# Patient Record
Sex: MALE | Race: WHITE | NOT HISPANIC OR LATINO | Employment: UNEMPLOYED | URBAN - METROPOLITAN AREA
[De-identification: names, ages, dates, MRNs, and addresses within clinical notes are randomized per-mention and may not be internally consistent; named-entity substitution may affect disease eponyms.]

---

## 2020-04-21 ENCOUNTER — APPOINTMENT (OUTPATIENT)
Dept: GENERAL RADIOLOGY | Facility: HOSPITAL | Age: 60
End: 2020-04-21

## 2020-04-21 ENCOUNTER — HOSPITAL ENCOUNTER (INPATIENT)
Facility: HOSPITAL | Age: 60
LOS: 5 days | Discharge: HOME | End: 2020-04-27
Attending: EMERGENCY MEDICINE | Admitting: INTERNAL MEDICINE

## 2020-04-21 DIAGNOSIS — R07.9 CHEST PAIN, UNSPECIFIED TYPE: Primary | ICD-10-CM

## 2020-04-21 DIAGNOSIS — E23.0 HYPOPITUITARISM (HCC): ICD-10-CM

## 2020-04-21 DIAGNOSIS — I10 UNCONTROLLED HYPERTENSION: ICD-10-CM

## 2020-04-21 DIAGNOSIS — E23.6 EMPTY SELLA SYNDROME (HCC): ICD-10-CM

## 2020-04-21 LAB
ALBUMIN SERPL-MCNC: 4 G/DL (ref 3.5–5.2)
ALBUMIN/GLOB SERPL: 1.4 G/DL
ALP SERPL-CCNC: 127 U/L (ref 39–117)
ALT SERPL W P-5'-P-CCNC: 53 U/L (ref 1–41)
ANION GAP SERPL CALCULATED.3IONS-SCNC: 15 MMOL/L (ref 5–15)
AST SERPL-CCNC: 52 U/L (ref 1–40)
BASOPHILS # BLD MANUAL: 0.11 10*3/MM3 (ref 0–0.2)
BASOPHILS NFR BLD AUTO: 1 % (ref 0–1.5)
BILIRUB SERPL-MCNC: 0.3 MG/DL (ref 0.2–1.2)
BUN BLD-MCNC: 15 MG/DL (ref 6–20)
BUN/CREAT SERPL: 11.9 (ref 7–25)
CALCIUM SPEC-SCNC: 8.9 MG/DL (ref 8.6–10.5)
CHLORIDE SERPL-SCNC: 101 MMOL/L (ref 98–107)
CO2 SERPL-SCNC: 22 MMOL/L (ref 22–29)
CREAT BLD-MCNC: 1.26 MG/DL (ref 0.76–1.27)
D DIMER PPP FEU-MCNC: 0.35 MCGFEU/ML (ref 0.17–0.59)
DEPRECATED RDW RBC AUTO: 42.4 FL (ref 37–54)
EOSINOPHIL # BLD MANUAL: 0.56 10*3/MM3 (ref 0–0.4)
EOSINOPHIL NFR BLD MANUAL: 5 % (ref 0.3–6.2)
ERYTHROCYTE [DISTWIDTH] IN BLOOD BY AUTOMATED COUNT: 13.9 % (ref 12.3–15.4)
GFR SERPL CREATININE-BSD FRML MDRD: 59 ML/MIN/1.73
GFR SERPL CREATININE-BSD FRML MDRD: 71 ML/MIN/1.73
GLOBULIN UR ELPH-MCNC: 2.9 GM/DL
GLUCOSE BLD-MCNC: 120 MG/DL (ref 65–99)
HCT VFR BLD AUTO: 41.2 % (ref 37.5–51)
HGB BLD-MCNC: 15 G/DL (ref 13–17.7)
HOLD SPECIMEN: NORMAL
LYMPHOCYTES # BLD MANUAL: 3.77 10*3/MM3 (ref 0.7–3.1)
LYMPHOCYTES NFR BLD MANUAL: 34 % (ref 19.6–45.3)
LYMPHOCYTES NFR BLD MANUAL: 5 % (ref 5–12)
MCH RBC QN AUTO: 31.7 PG (ref 26.6–33)
MCHC RBC AUTO-ENTMCNC: 36.3 G/DL (ref 31.5–35.7)
MCV RBC AUTO: 87.2 FL (ref 79–97)
MONOCYTES # BLD AUTO: 0.56 10*3/MM3 (ref 0.1–0.9)
NEUTROPHILS # BLD AUTO: 6.11 10*3/MM3 (ref 1.7–7)
NEUTROPHILS NFR BLD MANUAL: 53 % (ref 42.7–76)
NEUTS BAND NFR BLD MANUAL: 2 % (ref 0–5)
PLAT MORPH BLD: NORMAL
PLATELET # BLD AUTO: 244 10*3/MM3 (ref 140–450)
PMV BLD AUTO: 7.9 FL (ref 6–12)
POTASSIUM BLD-SCNC: 4 MMOL/L (ref 3.5–5.2)
PROT SERPL-MCNC: 6.9 G/DL (ref 6–8.5)
RBC # BLD AUTO: 4.73 10*6/MM3 (ref 4.14–5.8)
RBC MORPH BLD: NORMAL
SCAN SLIDE: NORMAL
SODIUM BLD-SCNC: 138 MMOL/L (ref 136–145)
TROPONIN T SERPL-MCNC: <0.01 NG/ML (ref 0–0.03)
WBC MORPH BLD: NORMAL
WBC NRBC COR # BLD: 11.1 10*3/MM3 (ref 3.4–10.8)
WHOLE BLOOD HOLD SPECIMEN: NORMAL

## 2020-04-21 PROCEDURE — 80061 LIPID PANEL: CPT | Performed by: NURSE PRACTITIONER

## 2020-04-21 PROCEDURE — 71045 X-RAY EXAM CHEST 1 VIEW: CPT

## 2020-04-21 PROCEDURE — 84443 ASSAY THYROID STIM HORMONE: CPT | Performed by: NURSE PRACTITIONER

## 2020-04-21 PROCEDURE — 99285 EMERGENCY DEPT VISIT HI MDM: CPT

## 2020-04-21 PROCEDURE — 83721 ASSAY OF BLOOD LIPOPROTEIN: CPT | Performed by: NURSE PRACTITIONER

## 2020-04-21 PROCEDURE — 80053 COMPREHEN METABOLIC PANEL: CPT | Performed by: EMERGENCY MEDICINE

## 2020-04-21 PROCEDURE — 83880 ASSAY OF NATRIURETIC PEPTIDE: CPT | Performed by: NURSE PRACTITIONER

## 2020-04-21 PROCEDURE — 85025 COMPLETE CBC W/AUTO DIFF WBC: CPT | Performed by: EMERGENCY MEDICINE

## 2020-04-21 PROCEDURE — 84484 ASSAY OF TROPONIN QUANT: CPT | Performed by: EMERGENCY MEDICINE

## 2020-04-21 PROCEDURE — 85007 BL SMEAR W/DIFF WBC COUNT: CPT | Performed by: EMERGENCY MEDICINE

## 2020-04-21 PROCEDURE — 93005 ELECTROCARDIOGRAM TRACING: CPT | Performed by: EMERGENCY MEDICINE

## 2020-04-21 PROCEDURE — 85379 FIBRIN DEGRADATION QUANT: CPT | Performed by: EMERGENCY MEDICINE

## 2020-04-21 RX ORDER — NITROGLYCERIN 0.4 MG/1
0.4 TABLET SUBLINGUAL
Status: COMPLETED | OUTPATIENT
Start: 2020-04-21 | End: 2020-04-22

## 2020-04-21 RX ORDER — ASPIRIN 81 MG/1
324 TABLET, CHEWABLE ORAL ONCE
Status: COMPLETED | OUTPATIENT
Start: 2020-04-21 | End: 2020-04-21

## 2020-04-21 RX ORDER — SODIUM CHLORIDE 0.9 % (FLUSH) 0.9 %
10 SYRINGE (ML) INJECTION AS NEEDED
Status: DISCONTINUED | OUTPATIENT
Start: 2020-04-21 | End: 2020-04-27 | Stop reason: HOSPADM

## 2020-04-21 RX ADMIN — NITROGLYCERIN 0.4 MG: 0.4 TABLET SUBLINGUAL at 23:14

## 2020-04-21 RX ADMIN — ASPIRIN 81 MG 324 MG: 81 TABLET ORAL at 23:12

## 2020-04-21 RX ADMIN — NITROGLYCERIN 0.4 MG: 0.4 TABLET SUBLINGUAL at 23:19

## 2020-04-22 ENCOUNTER — APPOINTMENT (OUTPATIENT)
Dept: NUCLEAR MEDICINE | Facility: HOSPITAL | Age: 60
End: 2020-04-22

## 2020-04-22 ENCOUNTER — APPOINTMENT (OUTPATIENT)
Dept: CARDIOLOGY | Facility: HOSPITAL | Age: 60
End: 2020-04-22

## 2020-04-22 PROBLEM — E78.2 MIXED HYPERLIPIDEMIA: Chronic | Status: ACTIVE | Noted: 2020-04-22

## 2020-04-22 PROBLEM — E78.1 HYPERTRIGLYCERIDEMIA: Status: ACTIVE | Noted: 2020-04-22

## 2020-04-22 PROBLEM — R74.8 ELEVATED LIVER ENZYMES: Status: ACTIVE | Noted: 2020-04-22

## 2020-04-22 PROBLEM — R07.9 CHEST PAIN: Status: ACTIVE | Noted: 2020-04-22

## 2020-04-22 PROBLEM — F32.A DEPRESSION: Status: ACTIVE | Noted: 2020-04-22

## 2020-04-22 PROBLEM — E23.6 EMPTY SELLA SYNDROME (HCC): Status: ACTIVE | Noted: 2020-04-22

## 2020-04-22 PROBLEM — E78.1 HYPERTRIGLYCERIDEMIA: Chronic | Status: ACTIVE | Noted: 2020-04-22

## 2020-04-22 PROBLEM — I10 ESSENTIAL HYPERTENSION: Chronic | Status: ACTIVE | Noted: 2020-04-22

## 2020-04-22 PROBLEM — F10.10 ALCOHOL ABUSE: Chronic | Status: ACTIVE | Noted: 2020-04-22

## 2020-04-22 PROBLEM — E78.2 MIXED HYPERLIPIDEMIA: Status: ACTIVE | Noted: 2020-04-22

## 2020-04-22 PROBLEM — F32.A DEPRESSION: Chronic | Status: ACTIVE | Noted: 2020-04-22

## 2020-04-22 PROBLEM — E66.01 OBESITY, CLASS III, BMI 40-49.9 (MORBID OBESITY) (HCC): Chronic | Status: ACTIVE | Noted: 2020-04-22

## 2020-04-22 PROBLEM — I42.9 CARDIOMYOPATHY (HCC): Status: ACTIVE | Noted: 2020-04-22

## 2020-04-22 LAB
ANION GAP SERPL CALCULATED.3IONS-SCNC: 12 MMOL/L (ref 5–15)
ARTICHOKE IGE QN: 151 MG/DL (ref 0–100)
BASOPHILS # BLD AUTO: 0.1 10*3/MM3 (ref 0–0.2)
BASOPHILS NFR BLD AUTO: 0.9 % (ref 0–1.5)
BH CV NUCLEAR PRIOR STUDY: 3
BH CV STRESS BP STAGE 1: NORMAL
BH CV STRESS BP STAGE 2: NORMAL
BH CV STRESS BP STAGE 3: NORMAL
BH CV STRESS BP STAGE 4: NORMAL
BH CV STRESS COMMENTS STAGE 1: NORMAL
BH CV STRESS COMMENTS STAGE 2: NORMAL
BH CV STRESS DOSE REGADENOSON STAGE 1: 0.4
BH CV STRESS DURATION MIN STAGE 1: 0
BH CV STRESS DURATION MIN STAGE 2: 4
BH CV STRESS DURATION SEC STAGE 1: 10
BH CV STRESS DURATION SEC STAGE 2: 0
BH CV STRESS HR STAGE 1: 82
BH CV STRESS HR STAGE 2: 84
BH CV STRESS HR STAGE 3: 83
BH CV STRESS HR STAGE 4: 80
BH CV STRESS PROTOCOL 1: NORMAL
BH CV STRESS RECOVERY BP: NORMAL MMHG
BH CV STRESS RECOVERY HR: 79 BPM
BH CV STRESS STAGE 1: 1
BH CV STRESS STAGE 2: 2
BH CV STRESS STAGE 3: 3
BH CV STRESS STAGE 4: 4
BUN BLD-MCNC: 21 MG/DL (ref 6–20)
BUN/CREAT SERPL: 17.5 (ref 7–25)
CALCIUM SPEC-SCNC: 8.8 MG/DL (ref 8.6–10.5)
CHLORIDE SERPL-SCNC: 104 MMOL/L (ref 98–107)
CHOLEST SERPL-MCNC: 259 MG/DL (ref 0–200)
CO2 SERPL-SCNC: 22 MMOL/L (ref 22–29)
CREAT BLD-MCNC: 1.2 MG/DL (ref 0.76–1.27)
DEPRECATED RDW RBC AUTO: 42.4 FL (ref 37–54)
EOSINOPHIL # BLD AUTO: 0.4 10*3/MM3 (ref 0–0.4)
EOSINOPHIL NFR BLD AUTO: 4.8 % (ref 0.3–6.2)
ERYTHROCYTE [DISTWIDTH] IN BLOOD BY AUTOMATED COUNT: 13.9 % (ref 12.3–15.4)
GFR SERPL CREATININE-BSD FRML MDRD: 62 ML/MIN/1.73
GLUCOSE BLD-MCNC: 96 MG/DL (ref 65–99)
HAV IGM SERPL QL IA: NORMAL
HBV CORE IGM SERPL QL IA: NORMAL
HBV SURFACE AG SERPL QL IA: NORMAL
HCT VFR BLD AUTO: 39 % (ref 37.5–51)
HCV AB SER DONR QL: NORMAL
HDLC SERPL-MCNC: 31 MG/DL (ref 40–60)
HGB BLD-MCNC: 13.1 G/DL (ref 13–17.7)
HOLD SPECIMEN: NORMAL
HOLD SPECIMEN: NORMAL
LDLC SERPL CALC-MCNC: ABNORMAL MG/DL
LDLC/HDLC SERPL: ABNORMAL {RATIO}
LV EF NUC BP: 57 %
LYMPHOCYTES # BLD AUTO: 3.2 10*3/MM3 (ref 0.7–3.1)
LYMPHOCYTES NFR BLD AUTO: 40.2 % (ref 19.6–45.3)
MAXIMAL PREDICTED HEART RATE: 162 BPM
MCH RBC QN AUTO: 29.4 PG (ref 26.6–33)
MCHC RBC AUTO-ENTMCNC: 33.6 G/DL (ref 31.5–35.7)
MCV RBC AUTO: 87.5 FL (ref 79–97)
MONOCYTES # BLD AUTO: 0.7 10*3/MM3 (ref 0.1–0.9)
MONOCYTES NFR BLD AUTO: 8.8 % (ref 5–12)
NEUTROPHILS # BLD AUTO: 3.6 10*3/MM3 (ref 1.7–7)
NEUTROPHILS NFR BLD AUTO: 45.3 % (ref 42.7–76)
NRBC BLD AUTO-RTO: 0.1 /100 WBC (ref 0–0.2)
NT-PROBNP SERPL-MCNC: 94.7 PG/ML (ref 5–900)
PERCENT MAX PREDICTED HR: 51.85 %
PLATELET # BLD AUTO: 198 10*3/MM3 (ref 140–450)
PMV BLD AUTO: 7.7 FL (ref 6–12)
POTASSIUM BLD-SCNC: 3.9 MMOL/L (ref 3.5–5.2)
RBC # BLD AUTO: 4.46 10*6/MM3 (ref 4.14–5.8)
SODIUM BLD-SCNC: 138 MMOL/L (ref 136–145)
STRESS BASELINE BP: NORMAL MMHG
STRESS BASELINE HR: 71 BPM
STRESS PERCENT HR: 61 %
STRESS POST PEAK BP: NORMAL MMHG
STRESS POST PEAK HR: 84 BPM
STRESS TARGET HR: 138 BPM
T4 FREE SERPL-MCNC: 0.97 NG/DL (ref 0.93–1.7)
TRIGL SERPL-MCNC: 1063 MG/DL (ref 0–150)
TROPONIN T SERPL-MCNC: <0.01 NG/ML (ref 0–0.03)
TROPONIN T SERPL-MCNC: <0.01 NG/ML (ref 0–0.03)
TSH SERPL DL<=0.05 MIU/L-ACNC: 3.88 UIU/ML (ref 0.27–4.2)
VLDLC SERPL-MCNC: ABNORMAL MG/DL
WBC NRBC COR # BLD: 8 10*3/MM3 (ref 3.4–10.8)
WHOLE BLOOD HOLD SPECIMEN: NORMAL

## 2020-04-22 PROCEDURE — 84439 ASSAY OF FREE THYROXINE: CPT | Performed by: INTERNAL MEDICINE

## 2020-04-22 PROCEDURE — 99223 1ST HOSP IP/OBS HIGH 75: CPT | Performed by: INTERNAL MEDICINE

## 2020-04-22 PROCEDURE — 80048 BASIC METABOLIC PNL TOTAL CA: CPT | Performed by: NURSE PRACTITIONER

## 2020-04-22 PROCEDURE — 0 TECHNETIUM SESTAMIBI: Performed by: INTERNAL MEDICINE

## 2020-04-22 PROCEDURE — 93018 CV STRESS TEST I&R ONLY: CPT | Performed by: INTERNAL MEDICINE

## 2020-04-22 PROCEDURE — 93306 TTE W/DOPPLER COMPLETE: CPT

## 2020-04-22 PROCEDURE — 84484 ASSAY OF TROPONIN QUANT: CPT | Performed by: NURSE PRACTITIONER

## 2020-04-22 PROCEDURE — 85025 COMPLETE CBC W/AUTO DIFF WBC: CPT | Performed by: NURSE PRACTITIONER

## 2020-04-22 PROCEDURE — 25010000002 REGADENOSON 0.4 MG/5ML SOLUTION: Performed by: INTERNAL MEDICINE

## 2020-04-22 PROCEDURE — 80074 ACUTE HEPATITIS PANEL: CPT | Performed by: NURSE PRACTITIONER

## 2020-04-22 PROCEDURE — A9500 TC99M SESTAMIBI: HCPCS | Performed by: INTERNAL MEDICINE

## 2020-04-22 PROCEDURE — 93016 CV STRESS TEST SUPVJ ONLY: CPT | Performed by: INTERNAL MEDICINE

## 2020-04-22 PROCEDURE — 78452 HT MUSCLE IMAGE SPECT MULT: CPT | Performed by: INTERNAL MEDICINE

## 2020-04-22 PROCEDURE — 93017 CV STRESS TEST TRACING ONLY: CPT

## 2020-04-22 PROCEDURE — 78452 HT MUSCLE IMAGE SPECT MULT: CPT

## 2020-04-22 RX ORDER — ACETAMINOPHEN 325 MG/1
650 TABLET ORAL EVERY 4 HOURS PRN
Status: DISCONTINUED | OUTPATIENT
Start: 2020-04-22 | End: 2020-04-23

## 2020-04-22 RX ORDER — BISACODYL 5 MG/1
5 TABLET, DELAYED RELEASE ORAL DAILY PRN
Status: DISCONTINUED | OUTPATIENT
Start: 2020-04-22 | End: 2020-04-27 | Stop reason: HOSPADM

## 2020-04-22 RX ORDER — LORAZEPAM 2 MG/ML
2 INJECTION INTRAMUSCULAR
Status: DISCONTINUED | OUTPATIENT
Start: 2020-04-22 | End: 2020-04-27 | Stop reason: HOSPADM

## 2020-04-22 RX ORDER — CHOLECALCIFEROL (VITAMIN D3) 125 MCG
5 CAPSULE ORAL NIGHTLY PRN
Status: DISCONTINUED | OUTPATIENT
Start: 2020-04-22 | End: 2020-04-27 | Stop reason: HOSPADM

## 2020-04-22 RX ORDER — LORAZEPAM 2 MG/ML
1 INJECTION INTRAMUSCULAR
Status: DISCONTINUED | OUTPATIENT
Start: 2020-04-22 | End: 2020-04-27 | Stop reason: HOSPADM

## 2020-04-22 RX ORDER — ONDANSETRON 4 MG/1
4 TABLET, FILM COATED ORAL EVERY 6 HOURS PRN
Status: DISCONTINUED | OUTPATIENT
Start: 2020-04-22 | End: 2020-04-27 | Stop reason: HOSPADM

## 2020-04-22 RX ORDER — LORAZEPAM 1 MG/1
2 TABLET ORAL
Status: DISCONTINUED | OUTPATIENT
Start: 2020-04-22 | End: 2020-04-27 | Stop reason: HOSPADM

## 2020-04-22 RX ORDER — SODIUM CHLORIDE 9 MG/ML
75 INJECTION, SOLUTION INTRAVENOUS CONTINUOUS
Status: DISPENSED | OUTPATIENT
Start: 2020-04-22 | End: 2020-04-23

## 2020-04-22 RX ORDER — LORAZEPAM 1 MG/1
1 TABLET ORAL
Status: DISCONTINUED | OUTPATIENT
Start: 2020-04-22 | End: 2020-04-27 | Stop reason: HOSPADM

## 2020-04-22 RX ORDER — ALUMINA, MAGNESIA, AND SIMETHICONE 2400; 2400; 240 MG/30ML; MG/30ML; MG/30ML
15 SUSPENSION ORAL EVERY 6 HOURS PRN
Status: DISCONTINUED | OUTPATIENT
Start: 2020-04-22 | End: 2020-04-27 | Stop reason: HOSPADM

## 2020-04-22 RX ORDER — ONDANSETRON 2 MG/ML
4 INJECTION INTRAMUSCULAR; INTRAVENOUS EVERY 6 HOURS PRN
Status: DISCONTINUED | OUTPATIENT
Start: 2020-04-22 | End: 2020-04-27 | Stop reason: HOSPADM

## 2020-04-22 RX ORDER — ACETAMINOPHEN 650 MG/1
650 SUPPOSITORY RECTAL EVERY 4 HOURS PRN
Status: DISCONTINUED | OUTPATIENT
Start: 2020-04-22 | End: 2020-04-27 | Stop reason: HOSPADM

## 2020-04-22 RX ORDER — ACETAMINOPHEN 160 MG/5ML
650 SOLUTION ORAL EVERY 4 HOURS PRN
Status: DISCONTINUED | OUTPATIENT
Start: 2020-04-22 | End: 2020-04-27 | Stop reason: HOSPADM

## 2020-04-22 RX ADMIN — SODIUM CHLORIDE 75 ML/HR: 900 INJECTION, SOLUTION INTRAVENOUS at 23:40

## 2020-04-22 RX ADMIN — NITROGLYCERIN 0.4 MG: 0.4 TABLET SUBLINGUAL at 02:43

## 2020-04-22 RX ADMIN — TECHNETIUM TC 99M SESTAMIBI 1 DOSE: 1 INJECTION INTRAVENOUS at 13:30

## 2020-04-22 RX ADMIN — REGADENOSON 0.4 MG: 0.08 INJECTION, SOLUTION INTRAVENOUS at 13:30

## 2020-04-22 RX ADMIN — MELATONIN TAB 5 MG 5 MG: 5 TAB at 03:07

## 2020-04-22 RX ADMIN — MELATONIN TAB 5 MG 5 MG: 5 TAB at 23:41

## 2020-04-22 RX ADMIN — LORAZEPAM 2 MG: 1 TABLET ORAL at 02:47

## 2020-04-22 RX ADMIN — LORAZEPAM 1 MG: 1 TABLET ORAL at 23:40

## 2020-04-22 RX ADMIN — TECHNETIUM TC 99M SESTAMIBI 1 DOSE: 1 INJECTION INTRAVENOUS at 11:45

## 2020-04-22 RX ADMIN — NITROGLYCERIN 0.5 INCH: 20 OINTMENT TOPICAL at 01:36

## 2020-04-22 NOTE — H&P
[facility] Medicine Services      Patient Name: Gabriel Medrano  : 1960  MRN: [id number]  Primary Care Physician: Christina, No Known  Date of admission: 2020    Patient Care Team:  Provider, No Known as PCP - General          Subjective   History Present Illness     Chief Complaint:   Chief Complaint   Patient presents with   • Chest Pain       Mr. Medrano is a 59 y.o. obese  male with a history of hypertension, medical noncompliance, presented to the [facility] ED on 2020 with a complaint of chest pain, shortness of breath, leg swelling.  Patient states he was mowing his lawn this afternoon and afterwards started to feel more short of breath, became diaphoretic with right-sided chest pain that radiated to his back.  Patient was given nitro upon arrival to the ED which relieved his pain.  Patient states he had had a heart cath a long time ago possibly when he was a resident in [place] and that he had some cardiomyopathy but has been noncompliant with medications and does not have a primary care physician here.  Patient states his chest pain is sharp, 7 out of 10, was constant until relieved by the nitroglycerin sublingual tabs.  Patient now complaining more short of shortness of breath.  Patient denies fever, congestion, malaise, cough, nausea, vomiting, diarrhea.     In the ED, initial troponin negative, glucose 120, potassium 4.0, creatinine 1.26, BUN 15, alkaline phosphatase 127, ALT 53, AST 52, d-dimer 0.35, WBC 11.10, Hgb 15.0, HCT 41.2, platelets 244.  EKG: Normal sinus rhythm with a right bundle branch block.  Chest x-ray: No active disease process.  Patient received 324 mg aspirin, nitro sublingual x2.  Patient will be admitted for further evaluation and management.    Review of Systems   Constitution: Negative.   HENT: Negative.    Eyes: Negative.    Cardiovascular: Positive for chest pain, dyspnea on exertion and leg swelling.   Respiratory:  Positive for shortness of breath.    Endocrine: Negative.    Hematologic/Lymphatic: Negative.    Skin: Negative.    Musculoskeletal: Negative.    Gastrointestinal: Negative.    Genitourinary: Negative.    Neurological: Negative.    Psychiatric/Behavioral: Positive for depression. The patient has insomnia.    Allergic/Immunologic: Negative.    All other systems reviewed and are negative.          Personal History     Past Medical History:   Past Medical History:   Diagnosis Date   • Hypertension        Surgical History:      Past Surgical History:   Procedure Laterality Date   • APPENDECTOMY     • LEFT HEART CATH             Family History: family history includes Heart disease in his father; Hyperlipidemia in his father; Hypertension in his father. Otherwise pertinent FHx was reviewed and unremarkable.     Social History:  reports that he has quit smoking. He has never used smokeless tobacco. He reports that he drinks alcohol. He reports that he has current or past drug history.      Medications:  Prior to Admission medications    Not on File       Allergies:  No Known Allergies    Objective   Objective     Vital Signs  Temp:  [97.5 °F (36.4 °C)] 97.5 °F (36.4 °C)  Heart Rate:  [80-90] 80  Resp:  [10-20] 10  BP: ()/() 139/80  SpO2:  [95 %-99 %] 99 %  on   ;      Body mass index is 42.2 kg/m².    Physical Exam   Constitutional: He is oriented to person, place, and time. He appears well-developed and well-nourished.   Morbidly obese   HENT:   Head: Normocephalic.   Eyes: Pupils are equal, round, and reactive to light. Conjunctivae are normal.   Neck: Normal range of motion.   Cardiovascular: Normal rate, regular rhythm, normal heart sounds and intact distal pulses.   Pulmonary/Chest: Effort normal and breath sounds normal.   Abdominal: Soft. Bowel sounds are normal.   Musculoskeletal: Normal range of motion. He exhibits edema.   Trace to 1+ bilateral lower extremities   Neurological: He is alert and  oriented to person, place, and time.   Skin: Skin is warm and dry. Capillary refill takes 2 to 3 seconds.   Psychiatric: His speech is normal and behavior is normal. Judgment and thought content normal. Cognition and memory are normal. He exhibits a depressed mood.   Tearful   Vitals reviewed.      Results Review:  I have personally reviewed most recent cardiac tracings, lab results and radiology images and interpretations and agree with findings.    Results from last 7 days   Lab Units 04/21/20  2305   WBC 10*3/mm3 11.10*   HEMOGLOBIN g/dL 15.0   HEMATOCRIT % 41.2   PLATELETS 10*3/mm3 244     Results from last 7 days   Lab Units 04/21/20  2305   SODIUM mmol/L 138   POTASSIUM mmol/L 4.0   CHLORIDE mmol/L 101   CO2 mmol/L 22.0   BUN mg/dL 15   CREATININE mg/dL 1.26   GLUCOSE mg/dL 120*   CALCIUM mg/dL 8.9   ALT (SGPT) U/L 53*   AST (SGOT) U/L 52*   TROPONIN T ng/mL <0.010     Estimated Creatinine Clearance: 87.7 mL/min (by C-G formula based on SCr of 1.26 mg/dL).  Brief Urine Lab Results     None          Microbiology Results (last 10 days)     ** No results found for the last 240 hours. **          ECG/EMG Results (most recent)     Procedure Component Value Units Date/Time    ECG 12 Lead [[id number]] Collected:  04/21/20 2258     Updated:  04/21/20 2300    Narrative:       HEART RATE= 89  bpm  RR Interval= 672  ms  ID Interval= 203  ms  P Horizontal Axis= 20  deg  P Front Axis= 31  deg  QRSD Interval= 153  ms  QT Interval= 430  ms  QRS Axis= 42  deg  T Wave Axis= 8  deg  - ABNORMAL ECG -  Sinus rhythm  Borderline prolonged ID interval  Right bundle branch block  Electronically Signed By:   Date and Time of Study: 2020-04-21 22:58:46                    Xr Chest 1 View    Result Date: 4/21/2020  No acute process.  Electronically Signed By-Rajesh Nair On:4/21/2020 11:39 PM This report was finalized on [id number] by  Rajesh Nair, .        Estimated Creatinine Clearance: 87.7 mL/min (by C-G formula based on SCr of  1.26 mg/dL).    Assessment/Plan   Assessment/Plan       Active Hospital Problems    Diagnosis  POA   • **Chest pain [R07.9]  Yes     Priority: High   • Depression [F32.9]  Yes     Priority: Medium   • Elevated liver enzymes [R74.8]  Yes     Priority: Medium   • Alcohol abuse [F10.10]  Yes     Priority: Medium      Resolved Hospital Problems   No resolved problems to display.     Chest pain  -Initial troponin<0.01  -Serial troponin  -Nitropaste to chest wall  -Echo in a.m.  -Check BNP, TSH, lipids  -Cardiac monitor  -EKG for chest pain  -Keep n.p.o.    Elevated liver enzymes  -May be secondary to alcohol use  -Check hepatitis panel  -Monitor    Depression  -Patient tearful, states he has had a rough 6 months.  -Patient denies suicidal or homicidal ideation  -Patient states he cannot sleep, he is tearful, and just feels depressed  -May need outpatient follow-up for depression    Alcohol abuse  -Patient drinks 3-4 beers daily  -We will place on alcohol withdrawal protocol  -Encourage lifestyle modification    Morbid obesity  -Encourage lifestyle modification          VTE Prophylaxis -   Mechanical Order History:      Ordered        04/22/20 0049  Place Sequential Compression Device  Once         04/22/20 0049  Maintain Sequential Compression Device  Continuous                 Pharmalogical Order History:     None          CODE STATUS:    Code Status and Medical Interventions:   Ordered at: 04/22/20 0049     Level Of Support Discussed With:    Patient     Code Status:    CPR     Medical Interventions (Level of Support Prior to Arrest):    Full           I discussed the patient's findings and my recommendations with patient.        Electronically signed by ISMA Jackman, 04/22/20, 1:46 AM.  [facility] Hospitalist Team

## 2020-04-22 NOTE — PROGRESS NOTES
Discharge Planning Assessment   [facility]     Patient Name: Gabriel Medrano  MRN: [id number]  Today's Date: 4/22/2020    Admit Date: 4/21/2020    Discharge Needs Assessment     Row Name 04/22/20 9607       Living Environment    Lives With  alone    Current Living Arrangements  home/apartment/condo    Primary Care Provided by  self    Provides Primary Care For  no one    Able to Return to Prior Arrangements  yes       Resource/Environmental Concerns    Resource/Environmental Concerns  none    Transportation Concerns  car, none       Transition Planning    Patient/Family Anticipates Transition to  home    Patient/Family Anticipated Services at Transition  none    Transportation Anticipated  family or friend will provide       Discharge Needs Assessment    Readmission Within the Last 30 Days  no previous admission in last 30 days    Concerns to be Addressed  denies needs/concerns at this time;discharge planning    Equipment Currently Used at Home  none    Anticipated Changes Related to Illness  none    Provided Post Acute Provider List?  Refused    Current Discharge Risk  lives alone        Discharge Plan     Row Name 04/22/20 8170       Plan    Plan  Anticipate routine home. May require walking oximetry 24-48 hours prior to d/c. SW notified of ETOH abuse. Will need one time hospital PCP f/u appt made prior to d/c.     Patient/Family in Agreement with Plan  yes    Plan Comments  Due to covid 19 pandemic and CM working off site called into patients room to discuss d/c plan. From home alone. I with ADLs. Patient still drives. Does not have a PCP and will need one arranged prior to hospital d/c. Notified Juliane WOODALL of ETOH abuse. Does not wear home O2 and currentl on 3 L NC. DC barriers: pending stress test and ECHO         Demographic Summary     Row Name 04/22/20 1459       General Information    Arrived From  emergency department    Required Notices Provided  Observation Status Notice    Referral Source  admission list     Reason for Consult  discharge planning    Preferred Language  English     Used During This Interaction  no        Functional Status     Row Name 04/22/20 1457       Functional Status    Usual Activity Tolerance  good    Current Activity Tolerance  good       Functional Status, IADL    Medications  independent    Meal Preparation  independent    Housekeeping  independent    Laundry  independent    Shopping  independent       Mental Status    General Appearance WDL  WDL          Patient Forms     Row Name 04/22/20 1501       Patient Forms    Important Message from Medicare (IMM)  -- Martin delivered 4/22            Shanta Yañez RN

## 2020-04-22 NOTE — CONSULTS
CARDIOLOGY CONSULT      Requesting Provider    Carolina Pathak MD      PATIENT IDENTIFICATION    Name: Gabriel Medrano Age: 59 y.o. Sex: male :  1960 MRN: [id number]    REASON FOR CONSULTATION:  Chest pain    CHIEF COMPLAINT:  Chest pain    HISTORY OF PRESENT ILLNESS:   This is a 59-year-old male with no established history of ischemic heart disease who presents to the hospital with complaint of chest pain and shortness of breath.  He reports that he had some lower extremity swelling as well.  He reports he tried mowing his lawn on the day of admission and afterwards he began to feel more short of breath and became diaphoretic.  He had chest discomfort however is located in the right side of his chest with radiation to his back.  He rated his pain as a 7 out of 10 in intensity.  It was sharp in characteristic.  His pain was completely palliated with the administration of nitroglycerin in the emergency department.  Serial cardiac biomarkers were negative for acute injury.  Myocardial perfusion study however was abnormal and suggestive of inferior ischemia.    The patient reports he did have a cardiac catheterization several years ago in [place].  He does not recall having a stent placed.    IMPRESSIONS  Chest pain with shortness of breath and diaphoresis with typical and atypical features  Abnormal EKG with right bundle branch block  Abnormal myocardial perfusion study suggestive of inferior ischemia  Hypertension  Hyperlipidemia with marked hypertriglyceridemia     Triglyceride levels of 1063  Obesity  History of traumatic brain injury in the past    RECOMMENDATIONS:  Can attempt statin therapy while closely monitoring LFTs  Risk benefits and options discussed with patient.  We will proceed with cardiac catheterization.  Will likely need Repatha or Praluent in addition to aggressive diet modification    Medical History    Past Medical History:   Diagnosis Date   • Hypertension         Surgical History   "  Past Surgical History:   Procedure Laterality Date   • APPENDECTOMY     • LEFT HEART CATH          Family History    Family History   Problem Relation Age of Onset   • Heart disease Father    • Hyperlipidemia Father    • Hypertension Father        Social History    Social History     Tobacco Use   • Smoking status: Former Smoker   • Smokeless tobacco: Never Used   Substance Use Topics   • Alcohol use: Yes     Frequency: 4 or more times a week     Drinks per session: 3 or 4     Binge frequency: Daily or almost daily        Allergies    No Known Allergies    REVIEW OF SYSTEMS:  Pertinent items are noted in HPI, all other systems reviewed and negative    OBJECTIVE     VITAL SIGNS:  Visit Vitals  /69   Pulse 70   Temp 97.7 °F (36.5 °C) (Oral)   Resp 17   Ht 177.8 cm (70\")   Wt 133 kg (294 lb)   SpO2 97%   BMI 42.18 kg/m²     Oxygen Therapy  SpO2: 97 %  Pulse Oximetry Type: Intermittent  Device (Oxygen Therapy): room air  Device (Oxygen Therapy): room air  Flow (L/min): 3  Flowsheet Rows      First Filed Value   Admission Height  177.8 cm (70\") Documented at 04/21/2020 2252   Admission Weight  133 kg (294 lb 1.5 oz) Documented at 04/21/2020 2252        Intake & Output (last 3 days)       04/19 0701 - 04/20 0700 04/20 0701 - 04/21 0700 04/21 0701 - 04/22 0700 04/22 0701 - 04/23 0700    P.O.    0    Total Intake(mL/kg)    0 (0)    Net    0                Lines, Drains & Airways    Active LDAs     Name:   Placement date:   Placement time:   Site:   Days:    Peripheral IV 04/21/20 2304 Right Arm   04/21/20 2304    Arm   less than 1              /69   Pulse 70   Temp 97.7 °F (36.5 °C) (Oral)   Resp 17   Ht 177.8 cm (70\")   Wt 133 kg (294 lb)   SpO2 97%   BMI 42.18 kg/m²     INTAKE/OUTPUT:    Intake/Output this shift:  No intake/output data recorded.  Intake/Output last 3 shifts:  No intake/output data recorded.      PHYSICAL EXAM:    General: Alert, cooperative, no distress, appears stated " age  Head:  Normocephalic, atraumatic, mucous membranes moist  Eyes:  Conjunctiva/corneas clear, EOM's intact     Neck:  Supple,  no adenopathy;      Lungs: Clear to auscultation bilaterally, no wheezes rhonchi rales are noted  Chest wall: No tenderness  Heart::  Regular rate and rhythm, S1 and S2 normal, no murmur, rub or gallop  Abdomen: Soft, non-tender, nondistended bowel sounds active.  Obese  Extremities: No cyanosis, clubbing, trace edema  Pulses: 2+ and symmetric all extremities  Skin:  No rashes or lesions  Neuro/psych: A&O x3. CN II through XII are grossly intact with appropriate affect    Scheduled Meds:           Continuous Infusions:         PRN Meds:    •  acetaminophen **OR** acetaminophen **OR** acetaminophen  •  aluminum-magnesium hydroxide-simethicone  •  bisacodyl  •  LORazepam **OR** LORazepam **OR** LORazepam **OR** LORazepam **OR** LORazepam **OR** LORazepam  •  magnesium hydroxide  •  melatonin  •  ondansetron **OR** ondansetron  •  sodium chloride     Data Review:     Xrays, labs reviewed personally by provider.    CBC    Results from last 7 days   Lab Units 04/22/20  0723 04/21/20  2305   WBC 10*3/mm3 8.00 11.10*   HEMOGLOBIN g/dL 13.1 15.0   PLATELETS 10*3/mm3 198 244     Cr Clearance Estimated Creatinine Clearance: 92.1 mL/min (by C-G formula based on SCr of 1.2 mg/dL).  Coag     HbA1C No results found for: HGBA1C  Blood Glucose No results found for: POCGLU  Infection     CMP   Results from last 7 days   Lab Units 04/22/20  0723 04/21/20  2305   SODIUM mmol/L 138 138   POTASSIUM mmol/L 3.9 4.0   CHLORIDE mmol/L 104 101   CO2 mmol/L 22.0 22.0   BUN mg/dL 21* 15   CREATININE mg/dL 1.20 1.26   GLUCOSE mg/dL 96 120*   ALBUMIN g/dL  --  4.00   BILIRUBIN mg/dL  --  0.3   ALK PHOS U/L  --  127*   AST (SGOT) U/L  --  52*   ALT (SGPT) U/L  --  53*     ABG      UA      USHA  No results found for: POCMETH, POCAMPHET, POCBARBITUR, POCBENZO, POCCOCAINE, POCOPIATES, POCOXYCODO, POCPHENCYC, POCPROPOXY,  POCTHC, POCTRICYC  Lysis Labs   Results from last 7 days   Lab Units 04/22/20  0723 04/21/20  2305   HEMOGLOBIN g/dL 13.1 15.0   PLATELETS 10*3/mm3 198 244   CREATININE mg/dL 1.20 1.26     Radiology(recent) Xr Chest 1 View    Result Date: 4/21/2020  No acute process.  Electronically Signed By-Rajesh Nair On:4/21/2020 11:39 PM This report was finalized on [id number] by  Rajesh Nair, .        Results from last 7 days   Lab Units 04/22/20  1410   TROPONIN T ng/mL <0.010       ECG/EMG Results (most recent)     Procedure Component Value Units Date/Time    ECG 12 Lead [[id number]] Collected:  04/21/20 2258     Updated:  04/21/20 2300    Narrative:       HEART RATE= 89  bpm  RR Interval= 672  ms  OR Interval= 203  ms  P Horizontal Axis= 20  deg  P Front Axis= 31  deg  QRSD Interval= 153  ms  QT Interval= 430  ms  QRS Axis= 42  deg  T Wave Axis= 8  deg  - ABNORMAL ECG -  Sinus rhythm  Borderline prolonged OR interval  Right bundle branch block  Electronically Signed By:   Date and Time of Study: 2020-04-21 22:58:46    Adult Transthoracic Echo Complete W/ Cont if Necessary Per Protocol [[id number]] Collected:  04/22/20 0835     Updated:  04/22/20 0917     BSA 2.5 m^2      RVIDd 2.7 cm      IVSd 1.1 cm      LVIDd 5.2 cm      LVIDs 3.8 cm      LVPWd 1.4 cm      IVS/LVPW 0.82     FS 26.3 %      EDV(Teich) 128.8 ml      ESV(Teich) 62.9 ml      EF(Teich) 51.2 %      EDV(cubed) 139.6 ml      ESV(cubed) 55.9 ml      EF(cubed) 60.0 %      LV mass(C)d 256.6 grams      LV mass(C)dI 104.4 grams/m^2      SV(Teich) 65.9 ml      SI(Teich) 26.8 ml/m^2      SV(cubed) 83.7 ml      SI(cubed) 34.0 ml/m^2      Ao root diam 3.6 cm      Ao root area 10.1 cm^2      ACS 2.3 cm      LVOT diam 2.3 cm      LVOT area 4.3 cm^2      RVOT diam 2.0 cm      RVOT area 3.2 cm^2      EDV(MOD-sp4) 125.7 ml      ESV(MOD-sp4) 40.5 ml      EF(MOD-sp4) 67.8 %      SV(MOD-sp4) 85.2 ml      SI(MOD-sp4) 34.7 ml/m^2      Ao root area (BSA corrected) 1.5     LV Selby  Vol (BSA corrected) 51.1 ml/m^2      LV Sys Vol (BSA corrected) 16.5 ml/m^2      Aortic R-R 0.99 sec      Aortic HR 60.6 BPM      MV E max alex 91.8 cm/sec      MV A max alex 78.3 cm/sec      MV E/A 1.2     MV V2 max 80.0 cm/sec      MV max PG 2.6 mmHg      MV V2 mean 50.4 cm/sec      MV mean PG 1.2 mmHg      MV V2 VTI 27.6 cm      MVA(VTI) 3.2 cm^2      MV dec slope 342.6 cm/sec^2      MV dec time 0.27 sec      Ao pk alex 109.3 cm/sec      Ao max PG 4.8 mmHg      Ao max PG (full) 1.7 mmHg      Ao V2 mean 78.0 cm/sec      Ao mean PG 2.6 mmHg      Ao mean PG (full) 1.1 mmHg      Ao V2 VTI 23.5 cm      ISIDORO(I,A) 3.8 cm^2      ISIDORO(I,D) 3.8 cm^2      ISIDORO(V,A) 3.5 cm^2      ISIDORO(V,D) 3.5 cm^2      LV V1 max PG 3.1 mmHg      LV V1 mean PG 1.6 mmHg      LV V1 max 87.8 cm/sec      LV V1 mean 57.6 cm/sec      LV V1 VTI 20.6 cm      CO(Ao) 14.4 l/min      CI(Ao) 5.9 l/min/m^2      SV(Ao) 237.2 ml      SI(Ao) 96.5 ml/m^2      CO(LVOT) 5.4 l/min      CI(LVOT) 2.2 l/min/m^2      SV(LVOT) 89.1 ml      SV(RVOT) 51.0 ml      SI(LVOT) 36.2 ml/m^2      PA V2 max 78.5 cm/sec      PA max PG 2.5 mmHg      PA max PG (full) 0.27 mmHg      BH CV ECHO DASHAWN - PVA(V,A) 3.0 cm^2      BH CV ECHO DASHAWN - PVA(V,D) 3.0 cm^2      RV V1 max PG 2.2 mmHg      RV V1 mean PG 1.2 mmHg      RV V1 max 74.0 cm/sec      RV V1 mean 51.6 cm/sec      RV V1 VTI 15.9 cm      Pulm Sys Alex 40.2 cm/sec      Pulm Selby Alex 42.2 cm/sec      Pulm S/D 0.95     Qp/Qs 0.57     Pulm A Revs Dur 0.1 sec      Pulm A Revs Alex 20.5 cm/sec      BH CV ECHO DASHAWN - BZI_BMI 42.2 kilograms/m^2      BH CV ECHO DASHAWN - BSA(HAYCOCK) 2.6 m^2      BH CV ECHO DASHAWN - BZI_METRIC_WEIGHT 133.4 kg      BH CV ECHO DASHAWN - BZI_METRIC_HEIGHT 177.8 cm      EF(MOD-bp) 68.0 %      LA dimension(2D) 4.1 cm           Imaging:  Imaging Results (Last 72 Hours)     Procedure Component Value Units Date/Time    XR Chest 1 View [[id number]] Collected:  04/21/20 2339     Updated:  04/21/20 2341    Narrative:           DATE OF EXAM:   4/21/2020 11:02 PM     PROCEDURE:   XR CHEST 1 VW-     INDICATIONS:   Chest pain protocol     COMPARISON:  No Comparisons Available     TECHNIQUE:   Portable chest radiograph.     FINDINGS:    The cardiomediastinal silhouette is within normal limits. The lungs are  clear. There is no pneumothorax, focal consolidation, or large pleural  effusion. Osseous structures grossly intact.        Impression:       No acute process.      Electronically Signed By-Rajesh Nair On:4/21/2020 11:39 PM  This report was finalized on [id number] by  Mac Lorenzo DO  04/22/20  16:29

## 2020-04-22 NOTE — PLAN OF CARE
Problem: Patient Care Overview  Goal: Plan of Care Review  Outcome: Ongoing (interventions implemented as appropriate)  Flowsheets  Taken 4/22/2020 0658  Progress: no change  Outcome Summary: Patient had an echocardiogram and stress test today. Cardiology was consulted and just came by, patient will need a heart cath tomorrow so NPO at midnight. Patient was on 2L O2 via NC but he is at 97% RA at this time. No complaints of shortness of breath or chest pain. Will continue to monitor.  Taken 4/22/2020 6791  Plan of Care Reviewed With: patient

## 2020-04-22 NOTE — ED PROVIDER NOTES
"Subjective   History of Present Illness  Chest pain  59-year-old male complains of a substernal and right-sided chest pressure of moderate to severe intensity that started yesterday while working in the yard with some mild activity he states it lasted for several minutes and resolved and returned today and increased in intensity this evening without relieving or exacerbating factor.  He denies fevers or chills.  He states he does feel short of breath.  He denies cough or congestion.  Review of Systems   Constitutional: Negative.    HENT: Negative.    Eyes: Negative.    Respiratory: Positive for shortness of breath. Negative for cough.    Cardiovascular: Positive for chest pain.   Gastrointestinal: Negative.    Genitourinary: Negative.    Musculoskeletal: Negative.    Skin: Negative.    Neurological: Negative.    Hematological: Negative.    Psychiatric/Behavioral: Negative.        No past medical history on file.  Hypertension  No Known Allergies    No past surgical history on file.    No family history on file.    Social History     Socioeconomic History   • Marital status: Single     Spouse name: Not on file   • Number of children: Not on file   • Years of education: Not on file   • Highest education level: Not on file   Father with coronary artery disease    Prior to Admission medications    Not on File     /94   Pulse 90   Temp 97.5 °F (36.4 °C) (Oral)   Resp 20   Ht 177.8 cm (70\")   Wt 133 kg (294 lb 1.5 oz)   SpO2 99%   BMI 42.20 kg/m²   I examined the patient using the appropriate personal protective equipment.        Objective   Physical Exam  General: Well-developed well-appearing, no acute distress, alert and appropriate  Eyes: Pupils round, sclera nonicteric  HEENT: Mucous membranes moist, no mucosal swelling  Neck: Supple, no nuchal rigidity, no lymphadenopathy  Respirations: Respirations nonlabored, equal breath sounds bilaterally, clear lungs  Heart regular rate and rhythm, no murmurs rubs " or gallops,   Abdomen soft nontender nondistended, no hepatosplenomegaly, no hernia, no mass, normal bowel sounds, no CVA tenderness  Extremities no clubbing cyanosis or edema, calves are symmetric and nontender  Neuro cranial nerves grossly intact, no focal limb deficits  Psych oriented, pleasant affect  Skin no rash, brisk cap refill  Procedures           ED Course    My EKG interpretation sinus rhythm, rate of 89, borderline first-degree AV block, right bundle branch block, no previous available for comparison       Results for orders placed or performed during the hospital encounter of 04/21/20   Comprehensive Metabolic Panel   Result Value Ref Range    Glucose 120 (H) 65 - 99 mg/dL    BUN 15 6 - 20 mg/dL    Creatinine 1.26 0.76 - 1.27 mg/dL    Sodium 138 136 - 145 mmol/L    Potassium 4.0 3.5 - 5.2 mmol/L    Chloride 101 98 - 107 mmol/L    CO2 22.0 22.0 - 29.0 mmol/L    Calcium 8.9 8.6 - 10.5 mg/dL    Total Protein 6.9 6.0 - 8.5 g/dL    Albumin 4.00 3.50 - 5.20 g/dL    ALT (SGPT) 53 (H) 1 - 41 U/L    AST (SGOT) 52 (H) 1 - 40 U/L    Alkaline Phosphatase 127 (H) 39 - 117 U/L    Total Bilirubin 0.3 0.2 - 1.2 mg/dL    eGFR Non African Amer 59 (L) >60 mL/min/1.73    eGFR  African Amer 71 >60 mL/min/1.73    Globulin 2.9 gm/dL    A/G Ratio 1.4 g/dL    BUN/Creatinine Ratio 11.9 7.0 - 25.0    Anion Gap 15.0 5.0 - 15.0 mmol/L   Troponin   Result Value Ref Range    Troponin T <0.010 0.000 - 0.030 ng/mL   CBC Auto Differential   Result Value Ref Range    WBC 11.10 (H) 3.40 - 10.80 10*3/mm3    RBC 4.73 4.14 - 5.80 10*6/mm3    Hemoglobin 15.0 13.0 - 17.7 g/dL    Hematocrit 41.2 37.5 - 51.0 %    MCV 87.2 79.0 - 97.0 fL    MCH 31.7 26.6 - 33.0 pg    MCHC 36.3 (H) 31.5 - 35.7 g/dL    RDW 13.9 12.3 - 15.4 %    RDW-SD 42.4 37.0 - 54.0 fl    MPV 7.9 6.0 - 12.0 fL    Platelets 244 140 - 450 10*3/mm3   D-dimer, Quantitative   Result Value Ref Range    D-Dimer, Quantitative 0.35 0.17 - 0.59 MCGFEU/mL   Scan Slide   Result Value Ref  Range    Scan Slide     Manual Differential   Result Value Ref Range    Neutrophil % 53.0 42.7 - 76.0 %    Lymphocyte % 34.0 19.6 - 45.3 %    Monocyte % 5.0 5.0 - 12.0 %    Eosinophil % 5.0 0.3 - 6.2 %    Basophil % 1.0 0.0 - 1.5 %    Bands %  2.0 0.0 - 5.0 %    Neutrophils Absolute 6.11 1.70 - 7.00 10*3/mm3    Lymphocytes Absolute 3.77 (H) 0.70 - 3.10 10*3/mm3    Monocytes Absolute 0.56 0.10 - 0.90 10*3/mm3    Eosinophils Absolute 0.56 (H) 0.00 - 0.40 10*3/mm3    Basophils Absolute 0.11 0.00 - 0.20 10*3/mm3    RBC Morphology Normal Normal    WBC Morphology Normal Normal    Platelet Morphology Normal Normal   Green Top (Gel)   Result Value Ref Range    Extra Tube      Lavender Top   Result Value Ref Range    Extra Tube           Xr Chest 1 View    Result Date: 4/21/2020  No acute process.  Electronically Signed By-Rajesh Nair On:4/21/2020 11:39 PM This report was finalized on [id number] by  Rajesh Nair, .                               MDM  Patient presents with chest pain that was exertional.  He was given nitroglycerin his blood pressure was initially elevated blood pressure improved he states his chest pain resolved.  His d-dimer screen was normal.  He does not describe symptoms of DVT or dissection.  His chest x-ray negative for pneumonia or pneumothorax.  He does have risk factors for coronary disease.  EKG shows no acute ischemic changes.  His initial troponin is normal.  Case discussed with the hospitalist service and patient placed asp observation for further chest pain evaluation.  Final diagnoses:   Chest pain, unspecified type   Uncontrolled hypertension            Rick Vanessa MD  04/22/20 0005

## 2020-04-22 NOTE — PLAN OF CARE
Problem: Cardiac: ACS (Acute Coronary Syndrome) (Adult)  Goal: Signs and Symptoms of Listed Potential Problems Will be Absent, Minimized or Managed (Cardiac: ACS)  Flowsheets (Taken 4/22/2020 0342)  Problems Assessed (Acute Coronary Syndrome): chest pain (angina); situational response  Problems Present (Acute Coronary Syn): chest pain (angina); situational response     Problem: Patient Care Overview  Goal: Plan of Care Review  Flowsheets  Taken 4/22/2020 0342 by Justus Armando LPN  Progress: no change  Outcome Summary: anxieyt soa at time, cwia score of 8, prn ativan and melatoinin given VSS NPO after MN cont to monitor  Taken 4/22/2020 0117 by Vitaliy Preciado RN  Plan of Care Reviewed With: patient     Problem: Suicide Risk (Adult)  Goal: Identify Related Risk Factors and Signs and Symptoms  Flowsheets (Taken 4/22/2020 0344)  Related Risk Factors (Suicide Risk): other (see comments)  Signs and Symptoms (Suicide Risk): anxiety  Note:   No present thought at this time .

## 2020-04-23 ENCOUNTER — APPOINTMENT (OUTPATIENT)
Dept: GENERAL RADIOLOGY | Facility: HOSPITAL | Age: 60
End: 2020-04-23

## 2020-04-23 ENCOUNTER — APPOINTMENT (OUTPATIENT)
Dept: CT IMAGING | Facility: HOSPITAL | Age: 60
End: 2020-04-23

## 2020-04-23 LAB
ALBUMIN SERPL-MCNC: 3.6 G/DL (ref 3.5–5.2)
ALBUMIN/GLOB SERPL: 1.2 G/DL
ALP SERPL-CCNC: 92 U/L (ref 39–117)
ALT SERPL W P-5'-P-CCNC: 41 U/L (ref 1–41)
ANION GAP SERPL CALCULATED.3IONS-SCNC: 14 MMOL/L (ref 5–15)
ANION GAP SERPL CALCULATED.3IONS-SCNC: 9 MMOL/L (ref 5–15)
AST SERPL-CCNC: 21 U/L (ref 1–40)
BASOPHILS # BLD AUTO: 0.1 10*3/MM3 (ref 0–0.2)
BASOPHILS # BLD AUTO: 0.1 10*3/MM3 (ref 0–0.2)
BASOPHILS NFR BLD AUTO: 0.8 % (ref 0–1.5)
BASOPHILS NFR BLD AUTO: 1.4 % (ref 0–1.5)
BH CV ECHO MEAS - ACS: 2.3 CM
BH CV ECHO MEAS - AO MAX PG (FULL): 1.7 MMHG
BH CV ECHO MEAS - AO MAX PG: 4.8 MMHG
BH CV ECHO MEAS - AO MEAN PG (FULL): 1.1 MMHG
BH CV ECHO MEAS - AO MEAN PG: 2.6 MMHG
BH CV ECHO MEAS - AO ROOT AREA (BSA CORRECTED): 1.5
BH CV ECHO MEAS - AO ROOT AREA: 10.1 CM^2
BH CV ECHO MEAS - AO ROOT DIAM: 3.6 CM
BH CV ECHO MEAS - AO V2 MAX: 109.3 CM/SEC
BH CV ECHO MEAS - AO V2 MEAN: 78 CM/SEC
BH CV ECHO MEAS - AO V2 VTI: 23.5 CM
BH CV ECHO MEAS - AORTIC HR: 60.6 BPM
BH CV ECHO MEAS - AORTIC R-R: 0.99 SEC
BH CV ECHO MEAS - AVA(I,A): 3.8 CM^2
BH CV ECHO MEAS - AVA(I,D): 3.8 CM^2
BH CV ECHO MEAS - AVA(V,A): 3.5 CM^2
BH CV ECHO MEAS - AVA(V,D): 3.5 CM^2
BH CV ECHO MEAS - BSA(HAYCOCK): 2.6 M^2
BH CV ECHO MEAS - BSA: 2.5 M^2
BH CV ECHO MEAS - BZI_BMI: 42.2 KILOGRAMS/M^2
BH CV ECHO MEAS - BZI_METRIC_HEIGHT: 177.8 CM
BH CV ECHO MEAS - BZI_METRIC_WEIGHT: 133.4 KG
BH CV ECHO MEAS - CI(AO): 5.9 L/MIN/M^2
BH CV ECHO MEAS - CI(LVOT): 2.2 L/MIN/M^2
BH CV ECHO MEAS - CO(AO): 14.4 L/MIN
BH CV ECHO MEAS - CO(LVOT): 5.4 L/MIN
BH CV ECHO MEAS - EDV(CUBED): 139.6 ML
BH CV ECHO MEAS - EDV(MOD-SP4): 125.7 ML
BH CV ECHO MEAS - EDV(TEICH): 128.8 ML
BH CV ECHO MEAS - EF(CUBED): 60 %
BH CV ECHO MEAS - EF(MOD-BP): 68 %
BH CV ECHO MEAS - EF(MOD-SP4): 67.8 %
BH CV ECHO MEAS - EF(TEICH): 51.2 %
BH CV ECHO MEAS - ESV(CUBED): 55.9 ML
BH CV ECHO MEAS - ESV(MOD-SP4): 40.5 ML
BH CV ECHO MEAS - ESV(TEICH): 62.9 ML
BH CV ECHO MEAS - FS: 26.3 %
BH CV ECHO MEAS - IVS/LVPW: 0.82
BH CV ECHO MEAS - IVSD: 1.1 CM
BH CV ECHO MEAS - LA DIMENSION(2D): 4.1 CM
BH CV ECHO MEAS - LV DIASTOLIC VOL/BSA (35-75): 51.1 ML/M^2
BH CV ECHO MEAS - LV MASS(C)D: 256.6 GRAMS
BH CV ECHO MEAS - LV MASS(C)DI: 104.4 GRAMS/M^2
BH CV ECHO MEAS - LV MAX PG: 3.1 MMHG
BH CV ECHO MEAS - LV MEAN PG: 1.6 MMHG
BH CV ECHO MEAS - LV SYSTOLIC VOL/BSA (12-30): 16.5 ML/M^2
BH CV ECHO MEAS - LV V1 MAX: 87.8 CM/SEC
BH CV ECHO MEAS - LV V1 MEAN: 57.6 CM/SEC
BH CV ECHO MEAS - LV V1 VTI: 20.6 CM
BH CV ECHO MEAS - LVIDD: 5.2 CM
BH CV ECHO MEAS - LVIDS: 3.8 CM
BH CV ECHO MEAS - LVOT AREA: 4.3 CM^2
BH CV ECHO MEAS - LVOT DIAM: 2.3 CM
BH CV ECHO MEAS - LVPWD: 1.4 CM
BH CV ECHO MEAS - MV A MAX VEL: 78.3 CM/SEC
BH CV ECHO MEAS - MV DEC SLOPE: 342.6 CM/SEC^2
BH CV ECHO MEAS - MV DEC TIME: 0.27 SEC
BH CV ECHO MEAS - MV E MAX VEL: 91.8 CM/SEC
BH CV ECHO MEAS - MV E/A: 1.2
BH CV ECHO MEAS - MV MAX PG: 2.6 MMHG
BH CV ECHO MEAS - MV MEAN PG: 1.2 MMHG
BH CV ECHO MEAS - MV V2 MAX: 80 CM/SEC
BH CV ECHO MEAS - MV V2 MEAN: 50.4 CM/SEC
BH CV ECHO MEAS - MV V2 VTI: 27.6 CM
BH CV ECHO MEAS - MVA(VTI): 3.2 CM^2
BH CV ECHO MEAS - PA MAX PG (FULL): 0.27 MMHG
BH CV ECHO MEAS - PA MAX PG: 2.5 MMHG
BH CV ECHO MEAS - PA V2 MAX: 78.5 CM/SEC
BH CV ECHO MEAS - PULM A REVS DUR: 0.1 SEC
BH CV ECHO MEAS - PULM A REVS VEL: 20.5 CM/SEC
BH CV ECHO MEAS - PULM DIAS VEL: 42.2 CM/SEC
BH CV ECHO MEAS - PULM S/D: 0.95
BH CV ECHO MEAS - PULM SYS VEL: 40.2 CM/SEC
BH CV ECHO MEAS - PVA(V,A): 3 CM^2
BH CV ECHO MEAS - PVA(V,D): 3 CM^2
BH CV ECHO MEAS - QP/QS: 0.57
BH CV ECHO MEAS - RV MAX PG: 2.2 MMHG
BH CV ECHO MEAS - RV MEAN PG: 1.2 MMHG
BH CV ECHO MEAS - RV V1 MAX: 74 CM/SEC
BH CV ECHO MEAS - RV V1 MEAN: 51.6 CM/SEC
BH CV ECHO MEAS - RV V1 VTI: 15.9 CM
BH CV ECHO MEAS - RVDD: 2.7 CM
BH CV ECHO MEAS - RVOT AREA: 3.2 CM^2
BH CV ECHO MEAS - RVOT DIAM: 2 CM
BH CV ECHO MEAS - SI(AO): 96.5 ML/M^2
BH CV ECHO MEAS - SI(CUBED): 34 ML/M^2
BH CV ECHO MEAS - SI(LVOT): 36.2 ML/M^2
BH CV ECHO MEAS - SI(MOD-SP4): 34.7 ML/M^2
BH CV ECHO MEAS - SI(TEICH): 26.8 ML/M^2
BH CV ECHO MEAS - SV(AO): 237.2 ML
BH CV ECHO MEAS - SV(CUBED): 83.7 ML
BH CV ECHO MEAS - SV(LVOT): 89.1 ML
BH CV ECHO MEAS - SV(MOD-SP4): 85.2 ML
BH CV ECHO MEAS - SV(RVOT): 51 ML
BH CV ECHO MEAS - SV(TEICH): 65.9 ML
BILIRUB SERPL-MCNC: 0.5 MG/DL (ref 0.2–1.2)
BUN BLD-MCNC: 12 MG/DL (ref 6–20)
BUN BLD-MCNC: 16 MG/DL (ref 6–20)
BUN/CREAT SERPL: 11.4 (ref 7–25)
BUN/CREAT SERPL: 14.8 (ref 7–25)
CALCIUM SPEC-SCNC: 8.8 MG/DL (ref 8.6–10.5)
CALCIUM SPEC-SCNC: 8.9 MG/DL (ref 8.6–10.5)
CHLORIDE SERPL-SCNC: 103 MMOL/L (ref 98–107)
CHLORIDE SERPL-SCNC: 106 MMOL/L (ref 98–107)
CHOLEST SERPL-MCNC: 236 MG/DL (ref 0–200)
CO2 SERPL-SCNC: 21 MMOL/L (ref 22–29)
CO2 SERPL-SCNC: 23 MMOL/L (ref 22–29)
CREAT BLD-MCNC: 1.05 MG/DL (ref 0.76–1.27)
CREAT BLD-MCNC: 1.08 MG/DL (ref 0.76–1.27)
DEPRECATED RDW RBC AUTO: 42.4 FL (ref 37–54)
DEPRECATED RDW RBC AUTO: 43.8 FL (ref 37–54)
EOSINOPHIL # BLD AUTO: 0.3 10*3/MM3 (ref 0–0.4)
EOSINOPHIL # BLD AUTO: 0.4 10*3/MM3 (ref 0–0.4)
EOSINOPHIL NFR BLD AUTO: 3.7 % (ref 0.3–6.2)
EOSINOPHIL NFR BLD AUTO: 4.7 % (ref 0.3–6.2)
ERYTHROCYTE [DISTWIDTH] IN BLOOD BY AUTOMATED COUNT: 13.8 % (ref 12.3–15.4)
ERYTHROCYTE [DISTWIDTH] IN BLOOD BY AUTOMATED COUNT: 13.9 % (ref 12.3–15.4)
FOLATE SERPL-MCNC: 10.3 NG/ML (ref 4.78–24.2)
GFR SERPL CREATININE-BSD FRML MDRD: 70 ML/MIN/1.73
GFR SERPL CREATININE-BSD FRML MDRD: 73 ML/MIN/1.73
GLOBULIN UR ELPH-MCNC: 3 GM/DL
GLUCOSE BLD-MCNC: 122 MG/DL (ref 65–99)
GLUCOSE BLD-MCNC: 98 MG/DL (ref 65–99)
HBA1C MFR BLD: 5.6 % (ref 3.5–5.6)
HCT VFR BLD AUTO: 41.4 % (ref 37.5–51)
HCT VFR BLD AUTO: 43.1 % (ref 37.5–51)
HDLC SERPL-MCNC: 31 MG/DL (ref 40–60)
HGB BLD-MCNC: 14.1 G/DL (ref 13–17.7)
HGB BLD-MCNC: 14.3 G/DL (ref 13–17.7)
INR PPP: 1.01 (ref 0.9–1.1)
LDLC SERPL CALC-MCNC: 128 MG/DL (ref 0–100)
LDLC/HDLC SERPL: 4.14 {RATIO}
LYMPHOCYTES # BLD AUTO: 2.2 10*3/MM3 (ref 0.7–3.1)
LYMPHOCYTES # BLD AUTO: 3.1 10*3/MM3 (ref 0.7–3.1)
LYMPHOCYTES NFR BLD AUTO: 29.2 % (ref 19.6–45.3)
LYMPHOCYTES NFR BLD AUTO: 37.1 % (ref 19.6–45.3)
MCH RBC QN AUTO: 29.5 PG (ref 26.6–33)
MCH RBC QN AUTO: 29.6 PG (ref 26.6–33)
MCHC RBC AUTO-ENTMCNC: 33.1 G/DL (ref 31.5–35.7)
MCHC RBC AUTO-ENTMCNC: 34 G/DL (ref 31.5–35.7)
MCV RBC AUTO: 87.1 FL (ref 79–97)
MCV RBC AUTO: 89.2 FL (ref 79–97)
MONOCYTES # BLD AUTO: 0.6 10*3/MM3 (ref 0.1–0.9)
MONOCYTES # BLD AUTO: 0.6 10*3/MM3 (ref 0.1–0.9)
MONOCYTES NFR BLD AUTO: 6.7 % (ref 5–12)
MONOCYTES NFR BLD AUTO: 7.7 % (ref 5–12)
NEUTROPHILS # BLD AUTO: 4.2 10*3/MM3 (ref 1.7–7)
NEUTROPHILS # BLD AUTO: 4.4 10*3/MM3 (ref 1.7–7)
NEUTROPHILS NFR BLD AUTO: 50.1 % (ref 42.7–76)
NEUTROPHILS NFR BLD AUTO: 58.6 % (ref 42.7–76)
NRBC BLD AUTO-RTO: 0 /100 WBC (ref 0–0.2)
NRBC BLD AUTO-RTO: 0.1 /100 WBC (ref 0–0.2)
PLATELET # BLD AUTO: 193 10*3/MM3 (ref 140–450)
PLATELET # BLD AUTO: 203 10*3/MM3 (ref 140–450)
PMV BLD AUTO: 7.7 FL (ref 6–12)
PMV BLD AUTO: 7.9 FL (ref 6–12)
POTASSIUM BLD-SCNC: 3.9 MMOL/L (ref 3.5–5.2)
POTASSIUM BLD-SCNC: 4 MMOL/L (ref 3.5–5.2)
PROT SERPL-MCNC: 6.6 G/DL (ref 6–8.5)
PROTHROMBIN TIME: 10.5 SECONDS (ref 9.6–11.7)
RBC # BLD AUTO: 4.75 10*6/MM3 (ref 4.14–5.8)
RBC # BLD AUTO: 4.83 10*6/MM3 (ref 4.14–5.8)
SODIUM BLD-SCNC: 136 MMOL/L (ref 136–145)
SODIUM BLD-SCNC: 140 MMOL/L (ref 136–145)
TRIGL SERPL-MCNC: 384 MG/DL (ref 0–150)
VIT B12 BLD-MCNC: 345 PG/ML (ref 211–946)
VLDLC SERPL-MCNC: 76.8 MG/DL
WBC NRBC COR # BLD: 7.5 10*3/MM3 (ref 3.4–10.8)
WBC NRBC COR # BLD: 8.4 10*3/MM3 (ref 3.4–10.8)

## 2020-04-23 PROCEDURE — 85610 PROTHROMBIN TIME: CPT | Performed by: INTERNAL MEDICINE

## 2020-04-23 PROCEDURE — B2111ZZ FLUOROSCOPY OF MULTIPLE CORONARY ARTERIES USING LOW OSMOLAR CONTRAST: ICD-10-PCS | Performed by: INTERNAL MEDICINE

## 2020-04-23 PROCEDURE — 85025 COMPLETE CBC W/AUTO DIFF WBC: CPT | Performed by: INTERNAL MEDICINE

## 2020-04-23 PROCEDURE — 99152 MOD SED SAME PHYS/QHP 5/>YRS: CPT | Performed by: INTERNAL MEDICINE

## 2020-04-23 PROCEDURE — 93458 L HRT ARTERY/VENTRICLE ANGIO: CPT | Performed by: INTERNAL MEDICINE

## 2020-04-23 PROCEDURE — 80061 LIPID PANEL: CPT | Performed by: INTERNAL MEDICINE

## 2020-04-23 PROCEDURE — 80053 COMPREHEN METABOLIC PANEL: CPT | Performed by: INTERNAL MEDICINE

## 2020-04-23 PROCEDURE — 83036 HEMOGLOBIN GLYCOSYLATED A1C: CPT | Performed by: INTERNAL MEDICINE

## 2020-04-23 PROCEDURE — 82607 VITAMIN B-12: CPT | Performed by: INTERNAL MEDICINE

## 2020-04-23 PROCEDURE — 82746 ASSAY OF FOLIC ACID SERUM: CPT | Performed by: INTERNAL MEDICINE

## 2020-04-23 PROCEDURE — 99153 MOD SED SAME PHYS/QHP EA: CPT | Performed by: INTERNAL MEDICINE

## 2020-04-23 PROCEDURE — 99233 SBSQ HOSP IP/OBS HIGH 50: CPT | Performed by: INTERNAL MEDICINE

## 2020-04-23 PROCEDURE — 0 IOPAMIDOL PER 1 ML: Performed by: INTERNAL MEDICINE

## 2020-04-23 PROCEDURE — B2151ZZ FLUOROSCOPY OF LEFT HEART USING LOW OSMOLAR CONTRAST: ICD-10-PCS | Performed by: INTERNAL MEDICINE

## 2020-04-23 PROCEDURE — C1769 GUIDE WIRE: HCPCS

## 2020-04-23 PROCEDURE — 70450 CT HEAD/BRAIN W/O DYE: CPT

## 2020-04-23 PROCEDURE — 25010000002 FENTANYL CITRATE (PF) 100 MCG/2ML SOLUTION: Performed by: INTERNAL MEDICINE

## 2020-04-23 PROCEDURE — 25010000002 MIDAZOLAM PER 1 MG: Performed by: INTERNAL MEDICINE

## 2020-04-23 PROCEDURE — C1894 INTRO/SHEATH, NON-LASER: HCPCS | Performed by: INTERNAL MEDICINE

## 2020-04-23 PROCEDURE — 93306 TTE W/DOPPLER COMPLETE: CPT | Performed by: INTERNAL MEDICINE

## 2020-04-23 PROCEDURE — 4A023N7 MEASUREMENT OF CARDIAC SAMPLING AND PRESSURE, LEFT HEART, PERCUTANEOUS APPROACH: ICD-10-PCS | Performed by: INTERNAL MEDICINE

## 2020-04-23 RX ORDER — SODIUM CHLORIDE 0.9 % (FLUSH) 0.9 %
3 SYRINGE (ML) INJECTION EVERY 12 HOURS SCHEDULED
Status: DISCONTINUED | OUTPATIENT
Start: 2020-04-23 | End: 2020-04-27 | Stop reason: HOSPADM

## 2020-04-23 RX ORDER — MIDAZOLAM HYDROCHLORIDE 1 MG/ML
INJECTION INTRAMUSCULAR; INTRAVENOUS AS NEEDED
Status: DISCONTINUED | OUTPATIENT
Start: 2020-04-23 | End: 2020-04-23 | Stop reason: HOSPADM

## 2020-04-23 RX ORDER — ASPIRIN 81 MG/1
324 TABLET, CHEWABLE ORAL ONCE
Status: DISCONTINUED | OUTPATIENT
Start: 2020-04-23 | End: 2020-04-27 | Stop reason: HOSPADM

## 2020-04-23 RX ORDER — OXYCODONE HYDROCHLORIDE 5 MG/1
5 TABLET ORAL ONCE
Status: COMPLETED | OUTPATIENT
Start: 2020-04-23 | End: 2020-04-23

## 2020-04-23 RX ORDER — TERAZOSIN 2 MG/1
2 CAPSULE ORAL ONCE
Status: DISCONTINUED | OUTPATIENT
Start: 2020-04-23 | End: 2020-04-23 | Stop reason: SDUPTHER

## 2020-04-23 RX ORDER — ATROPINE SULFATE 1 MG/ML
.5-1 INJECTION, SOLUTION INTRAMUSCULAR; INTRAVENOUS; SUBCUTANEOUS
Status: DISCONTINUED | OUTPATIENT
Start: 2020-04-23 | End: 2020-04-27 | Stop reason: HOSPADM

## 2020-04-23 RX ORDER — ONDANSETRON 4 MG/1
4 TABLET, FILM COATED ORAL EVERY 6 HOURS PRN
Status: DISCONTINUED | OUTPATIENT
Start: 2020-04-23 | End: 2020-04-23

## 2020-04-23 RX ORDER — METHYLPREDNISOLONE SODIUM SUCCINATE 125 MG/2ML
120 INJECTION, POWDER, LYOPHILIZED, FOR SOLUTION INTRAMUSCULAR; INTRAVENOUS ONCE
Status: DISCONTINUED | OUTPATIENT
Start: 2020-04-23 | End: 2020-04-23

## 2020-04-23 RX ORDER — SODIUM CHLORIDE 9 MG/ML
30 INJECTION, SOLUTION INTRAVENOUS CONTINUOUS
Status: DISPENSED | OUTPATIENT
Start: 2020-04-23 | End: 2020-04-24

## 2020-04-23 RX ORDER — NITROGLYCERIN 0.4 MG/1
0.4 TABLET SUBLINGUAL
Status: DISCONTINUED | OUTPATIENT
Start: 2020-04-23 | End: 2020-04-27 | Stop reason: HOSPADM

## 2020-04-23 RX ORDER — FENTANYL CITRATE 50 UG/ML
50 INJECTION, SOLUTION INTRAMUSCULAR; INTRAVENOUS
Status: DISCONTINUED | OUTPATIENT
Start: 2020-04-23 | End: 2020-04-23 | Stop reason: SDUPTHER

## 2020-04-23 RX ORDER — LIDOCAINE HYDROCHLORIDE 20 MG/ML
INJECTION, SOLUTION INFILTRATION; PERINEURAL AS NEEDED
Status: DISCONTINUED | OUTPATIENT
Start: 2020-04-23 | End: 2020-04-23 | Stop reason: HOSPADM

## 2020-04-23 RX ORDER — ACETAMINOPHEN 325 MG/1
650 TABLET ORAL EVERY 4 HOURS PRN
Status: DISCONTINUED | OUTPATIENT
Start: 2020-04-23 | End: 2020-04-27 | Stop reason: HOSPADM

## 2020-04-23 RX ORDER — SODIUM CHLORIDE 0.9 % (FLUSH) 0.9 %
10 SYRINGE (ML) INJECTION AS NEEDED
Status: DISCONTINUED | OUTPATIENT
Start: 2020-04-23 | End: 2020-04-27 | Stop reason: HOSPADM

## 2020-04-23 RX ORDER — DIPHENHYDRAMINE HYDROCHLORIDE 50 MG/ML
50 INJECTION INTRAMUSCULAR; INTRAVENOUS ONCE
Status: DISCONTINUED | OUTPATIENT
Start: 2020-04-23 | End: 2020-04-23

## 2020-04-23 RX ORDER — SODIUM CHLORIDE 9 MG/ML
250 INJECTION, SOLUTION INTRAVENOUS ONCE AS NEEDED
Status: DISCONTINUED | OUTPATIENT
Start: 2020-04-23 | End: 2020-04-27 | Stop reason: HOSPADM

## 2020-04-23 RX ORDER — ALUMINA, MAGNESIA, AND SIMETHICONE 2400; 2400; 240 MG/30ML; MG/30ML; MG/30ML
15 SUSPENSION ORAL EVERY 6 HOURS PRN
Status: DISCONTINUED | OUTPATIENT
Start: 2020-04-23 | End: 2020-04-23

## 2020-04-23 RX ORDER — DIPHENHYDRAMINE HCL 25 MG
25 TABLET ORAL EVERY 6 HOURS PRN
Status: DISCONTINUED | OUTPATIENT
Start: 2020-04-23 | End: 2020-04-27 | Stop reason: HOSPADM

## 2020-04-23 RX ORDER — ATORVASTATIN CALCIUM 40 MG/1
40 TABLET, FILM COATED ORAL NIGHTLY
Status: DISCONTINUED | OUTPATIENT
Start: 2020-04-23 | End: 2020-04-27 | Stop reason: HOSPADM

## 2020-04-23 RX ORDER — ASPIRIN 81 MG/1
81 TABLET ORAL DAILY
Status: DISCONTINUED | OUTPATIENT
Start: 2020-04-23 | End: 2020-04-23 | Stop reason: SDUPTHER

## 2020-04-23 RX ORDER — FENTANYL CITRATE 50 UG/ML
INJECTION, SOLUTION INTRAMUSCULAR; INTRAVENOUS AS NEEDED
Status: DISCONTINUED | OUTPATIENT
Start: 2020-04-23 | End: 2020-04-23 | Stop reason: HOSPADM

## 2020-04-23 RX ORDER — TERAZOSIN 2 MG/1
2 CAPSULE ORAL NIGHTLY
Status: DISCONTINUED | OUTPATIENT
Start: 2020-04-23 | End: 2020-04-27 | Stop reason: HOSPADM

## 2020-04-23 RX ORDER — ASPIRIN 81 MG/1
81 TABLET ORAL DAILY
Status: DISCONTINUED | OUTPATIENT
Start: 2020-04-24 | End: 2020-04-27 | Stop reason: HOSPADM

## 2020-04-23 RX ORDER — ONDANSETRON 2 MG/ML
4 INJECTION INTRAMUSCULAR; INTRAVENOUS EVERY 6 HOURS PRN
Status: DISCONTINUED | OUTPATIENT
Start: 2020-04-23 | End: 2020-04-27 | Stop reason: HOSPADM

## 2020-04-23 RX ORDER — HYDROCODONE BITARTRATE AND ACETAMINOPHEN 5; 325 MG/1; MG/1
1 TABLET ORAL EVERY 4 HOURS PRN
Status: DISCONTINUED | OUTPATIENT
Start: 2020-04-23 | End: 2020-04-27 | Stop reason: HOSPADM

## 2020-04-23 RX ORDER — FENTANYL CITRATE 50 UG/ML
50 INJECTION, SOLUTION INTRAMUSCULAR; INTRAVENOUS
Status: COMPLETED | OUTPATIENT
Start: 2020-04-23 | End: 2020-04-23

## 2020-04-23 RX ORDER — ONDANSETRON 2 MG/ML
4 INJECTION INTRAMUSCULAR; INTRAVENOUS EVERY 6 HOURS PRN
Status: DISCONTINUED | OUTPATIENT
Start: 2020-04-23 | End: 2020-04-23

## 2020-04-23 RX ADMIN — ATORVASTATIN CALCIUM 40 MG: 40 TABLET, FILM COATED ORAL at 20:52

## 2020-04-23 RX ADMIN — FENTANYL CITRATE 50 MCG: 50 INJECTION, SOLUTION INTRAMUSCULAR; INTRAVENOUS at 15:51

## 2020-04-23 RX ADMIN — OXYCODONE HYDROCHLORIDE 5 MG: 5 TABLET ORAL at 19:03

## 2020-04-23 RX ADMIN — Medication 3 ML: at 20:51

## 2020-04-23 RX ADMIN — MELATONIN TAB 5 MG 5 MG: 5 TAB at 23:35

## 2020-04-23 RX ADMIN — TERAZOSIN HYDROCHLORIDE 2 MG: 2 CAPSULE ORAL at 19:31

## 2020-04-23 NOTE — PLAN OF CARE
Problem: Patient Care Overview  Goal: Plan of Care Review  Outcome: Ongoing (interventions implemented as appropriate)  Flowsheets (Taken 4/22/2020 2130 by Christen Panchal RN)  Plan of Care Reviewed With: patient   Patient scheduled for cardiac cath today; will evaluate 4/23.

## 2020-04-23 NOTE — PATIENT CARE CONFERENCE
DNR / Limited Aggressive Measures    Palliative care  met with pt to complete advance care planning, by request of patient.  Pt acknowledged that he is already a DNR and not interested in having any heroic measures performed.  Pt reports that he does not have a lot of social support locally, but he has a sister, Nicole, who lives in [place], [place] who he trusts to be his healthcare representative.  Pt reports already talking this over with her and she has agreed to be his healthcare representative.  Healthcare surrogate form completed and sent to medical records.  Pt also expressed wish to complete a POST form to establish his code status and medical preferences.  POST form completed noting that he wishes to be a DNR.  Pt reported at first that he only wanted comfort measures, but upon giving him examples of what this would exclude he agreed that he would be willing to have limited aggressive measures for stabilization only.  Pt reports he would not want any artificial nutrition of any sort.  POST form taken to Dr. Pathak for authorization and signed.  Original POST given to pt and copy sent to medical records.  Emotional support provided.  Palliative care team is not following pt as yet, visit completed for advance care planning purposes only.

## 2020-04-23 NOTE — PLAN OF CARE
Pt scheduled for cardiac cath today.  Will see s/p procedure.  OT did not enter room.    Charlotte Lane, OTR/L

## 2020-04-23 NOTE — PROGRESS NOTES
Discharge Planning Assessment   [place]     Patient Name: Gabriel Medrano  MRN: [id number]  Today's Date: 4/23/2020    Admit Date: 4/21/2020      Barriers to discharge is pending cardiac cath today            Patient Forms    Important Message from Medicare ([facility])  Delivered    Delivered to  Patient talked to patient via phone and information explained; he verbalizes understanding of content of letter    Method of delivery  Telephone          Carol naegele rn  Case management  Office number [phone]  Cell phone [phone]

## 2020-04-23 NOTE — DISCHARGE INSTRUCTIONS
Post Heart Cath Instructions:    Call Dr. Villarreal's office to schedule a follow up appointment in 2 weeks at [phone].    1) Drink plenty of fluids for the next 24 hours.  This helps to eliminate the dye used in your procedure through urination.  You may resume a normal diet; however, try to avoid foods that would cause gas or constipation.    2) Sedative medication given to you during your catheterization may decrease your judgement and reaction time for up to 24-48 hours.  Therefore:  a. DO NOT drive or operate hazardous machinery (48 hours)  b. DO NOT consume alcoholic beverages  c. DO NOT make any important/legal decisions  d. Have someone stay with you for at least 24 hours    3) To allow proper healing and prevent bleeding, the following activities are to be strictly avoided for the next 24-48 hours:  a. Excessive bending at wound site  b. Straining (anything that would tense up muscles around the affected puncture site)  c. Lifting objects greater than 5 pounds, pushing, or pulling for 5 days  i. For Groin Cases:  1. Refrain from sexual activity  2. Refrain from running or vigorous walking  3. No prolonged sitting or standing  4. Limit stair climbing as much as possible    4) Keep the puncture site clean and dry.  You may remove the dressing tomorrow and replace it with a band-aid for at least one additional day.  Gently clean the site with mild soap and water.  No scrubbing/rubbing and lightly pat the area dry.  Showers are acceptable; however, avoid submerging in water (tub baths, hot tubs, swimming pools, dishwater, etc…) for at least one week.  The site should be completely healed before resuming these activities to reduce the risk of infection.  Check the site often.  Watch for signs and symptoms of infection and notify your physician if any of the following occur:  a. Bleeding or an increase in swelling at the puncture site  b. Fever  c. Increased soreness around puncture site  d. Foul odor or  significant drainage from the puncture site  e. Swelling, redness, or warmth at the puncture site  **A bruise or small “pea sized” lump under the skin at the puncture site is not unusual.  This should disappear within 3-4 weeks.**    5) CONTACT YOUR PHYSICIAN OR CALL 911 IF YOU EXPERIENCE ANY OF THE FOLLOWING:  a. Increased angina (chest pain) or frequent sensations of pressure, burning, pain, or other discomfort in the chest, arm, jaws, or stomach  b. Lightheadedness, dizziness, faint feeling, sweating, or difficulty breathing  c. Odd sensation changes like numbness, tingling, coldness, or pain in the arm or leg in which the catheter was inserted  d. Limb in which the catheter was inserted becomes pale/bluish in color    IMPORTANT:  Although this occurs very rarely, if you should develop bright red or excessive bleeding, feel a “pop” inside at the insertion site, or notice a sudden increase in swelling larger than a walnut, you should call 911.  Hold continuous firm pressure to the access site until emergency personnel arrive.  It is best if someone else can do this for you.

## 2020-04-23 NOTE — PLAN OF CARE
Problem: Patient Care Overview  Goal: Plan of Care Review  Outcome: Ongoing (interventions implemented as appropriate)  Flowsheets (Taken 4/23/2020 0306)  Progress: no change  Note:   Patient has cardiac catheterization scheduled for 1400 on 4/23. Consent signed and in chart. Patient has been NPO since 0000. Patient denies any chest pain during this shift. Patient independent and on intermittent nasal cannula.

## 2020-04-23 NOTE — PLAN OF CARE
Problem: Cardiac: ACS (Acute Coronary Syndrome) (Adult)  Goal: Signs and Symptoms of Listed Potential Problems Will be Absent, Minimized or Managed (Cardiac: ACS)  Outcome: Ongoing (interventions implemented as appropriate)  Note:   Pt had heart cath today. Cooperative and no complaints. Will continue to monitor.      Problem: Patient Care Overview  Goal: Plan of Care Review  Outcome: Ongoing (interventions implemented as appropriate)  Goal: Individualization and Mutuality  Outcome: Ongoing (interventions implemented as appropriate)  Goal: Discharge Needs Assessment  Outcome: Ongoing (interventions implemented as appropriate)  Goal: Interprofessional Rounds/Family Conf  Outcome: Ongoing (interventions implemented as appropriate)     Problem: Suicide Risk (Adult)  Goal: Identify Related Risk Factors and Signs and Symptoms  Outcome: Ongoing (interventions implemented as appropriate)  Goal: Strength-Based Wellness/Recovery  Outcome: Ongoing (interventions implemented as appropriate)  Goal: Physical Safety  Outcome: Ongoing (interventions implemented as appropriate)

## 2020-04-24 PROBLEM — F33.2 SEVERE RECURRENT MAJOR DEPRESSION WITHOUT PSYCHOTIC FEATURES (HCC): Status: ACTIVE | Noted: 2020-04-22

## 2020-04-24 LAB
ANION GAP SERPL CALCULATED.3IONS-SCNC: 11 MMOL/L (ref 5–15)
BUN BLD-MCNC: 14 MG/DL (ref 6–20)
BUN/CREAT SERPL: 13.1 (ref 7–25)
CALCIUM SPEC-SCNC: 8.8 MG/DL (ref 8.6–10.5)
CHLORIDE SERPL-SCNC: 104 MMOL/L (ref 98–107)
CHOLEST SERPL-MCNC: 210 MG/DL (ref 0–200)
CO2 SERPL-SCNC: 25 MMOL/L (ref 22–29)
CREAT BLD-MCNC: 1.07 MG/DL (ref 0.76–1.27)
DEPRECATED RDW RBC AUTO: 42.9 FL (ref 37–54)
ERYTHROCYTE [DISTWIDTH] IN BLOOD BY AUTOMATED COUNT: 14 % (ref 12.3–15.4)
GFR SERPL CREATININE-BSD FRML MDRD: 71 ML/MIN/1.73
GLUCOSE BLD-MCNC: 104 MG/DL (ref 65–99)
HCT VFR BLD AUTO: 38.1 % (ref 37.5–51)
HDLC SERPL-MCNC: 33 MG/DL (ref 40–60)
HGB BLD-MCNC: 13.3 G/DL (ref 13–17.7)
LDLC SERPL CALC-MCNC: 140 MG/DL (ref 0–100)
LDLC/HDLC SERPL: 4.24 {RATIO}
MCH RBC QN AUTO: 30.7 PG (ref 26.6–33)
MCHC RBC AUTO-ENTMCNC: 35 G/DL (ref 31.5–35.7)
MCV RBC AUTO: 87.7 FL (ref 79–97)
PLATELET # BLD AUTO: 182 10*3/MM3 (ref 140–450)
PMV BLD AUTO: 7.9 FL (ref 6–12)
POTASSIUM BLD-SCNC: 4.2 MMOL/L (ref 3.5–5.2)
RBC # BLD AUTO: 4.34 10*6/MM3 (ref 4.14–5.8)
SODIUM BLD-SCNC: 140 MMOL/L (ref 136–145)
TRIGL SERPL-MCNC: 185 MG/DL (ref 0–150)
TROPONIN T SERPL-MCNC: <0.01 NG/ML (ref 0–0.03)
VLDLC SERPL-MCNC: 37 MG/DL
WBC NRBC COR # BLD: 7.7 10*3/MM3 (ref 3.4–10.8)

## 2020-04-24 PROCEDURE — 85027 COMPLETE CBC AUTOMATED: CPT | Performed by: INTERNAL MEDICINE

## 2020-04-24 PROCEDURE — 93005 ELECTROCARDIOGRAM TRACING: CPT | Performed by: INTERNAL MEDICINE

## 2020-04-24 PROCEDURE — 99232 SBSQ HOSP IP/OBS MODERATE 35: CPT | Performed by: INTERNAL MEDICINE

## 2020-04-24 PROCEDURE — 84484 ASSAY OF TROPONIN QUANT: CPT | Performed by: NURSE PRACTITIONER

## 2020-04-24 PROCEDURE — 99222 1ST HOSP IP/OBS MODERATE 55: CPT | Performed by: PSYCHIATRY & NEUROLOGY

## 2020-04-24 PROCEDURE — 97165 OT EVAL LOW COMPLEX 30 MIN: CPT

## 2020-04-24 PROCEDURE — 80061 LIPID PANEL: CPT | Performed by: INTERNAL MEDICINE

## 2020-04-24 PROCEDURE — 80048 BASIC METABOLIC PNL TOTAL CA: CPT | Performed by: INTERNAL MEDICINE

## 2020-04-24 PROCEDURE — 94799 UNLISTED PULMONARY SVC/PX: CPT

## 2020-04-24 PROCEDURE — 94640 AIRWAY INHALATION TREATMENT: CPT

## 2020-04-24 RX ORDER — ZOLPIDEM TARTRATE 5 MG/1
5 TABLET ORAL NIGHTLY PRN
Status: DISCONTINUED | OUTPATIENT
Start: 2020-04-24 | End: 2020-04-27 | Stop reason: HOSPADM

## 2020-04-24 RX ORDER — IPRATROPIUM BROMIDE AND ALBUTEROL SULFATE 2.5; .5 MG/3ML; MG/3ML
3 SOLUTION RESPIRATORY (INHALATION) EVERY 4 HOURS PRN
Status: DISCONTINUED | OUTPATIENT
Start: 2020-04-24 | End: 2020-04-27 | Stop reason: HOSPADM

## 2020-04-24 RX ADMIN — NITROGLYCERIN 0.4 MG: 0.4 TABLET SUBLINGUAL at 02:54

## 2020-04-24 RX ADMIN — ASPIRIN 81 MG: 81 TABLET, COATED ORAL at 08:41

## 2020-04-24 RX ADMIN — LORAZEPAM 1 MG: 1 TABLET ORAL at 08:41

## 2020-04-24 RX ADMIN — HYDROCODONE BITARTRATE AND ACETAMINOPHEN 1 TABLET: 5; 325 TABLET ORAL at 00:07

## 2020-04-24 RX ADMIN — Medication 3 ML: at 08:41

## 2020-04-24 RX ADMIN — IPRATROPIUM BROMIDE AND ALBUTEROL SULFATE 3 ML: .5; 3 SOLUTION RESPIRATORY (INHALATION) at 03:52

## 2020-04-24 RX ADMIN — ATORVASTATIN CALCIUM 40 MG: 40 TABLET, FILM COATED ORAL at 21:03

## 2020-04-24 RX ADMIN — TERAZOSIN HYDROCHLORIDE 2 MG: 2 CAPSULE ORAL at 21:03

## 2020-04-24 RX ADMIN — HYDROCODONE BITARTRATE AND ACETAMINOPHEN 1 TABLET: 5; 325 TABLET ORAL at 21:18

## 2020-04-24 RX ADMIN — Medication 3 ML: at 21:00

## 2020-04-24 RX ADMIN — SERTRALINE HYDROCHLORIDE 25 MG: 50 TABLET ORAL at 16:33

## 2020-04-24 RX ADMIN — HYDROCODONE BITARTRATE AND ACETAMINOPHEN 1 TABLET: 5; 325 TABLET ORAL at 08:40
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
## 2020-04-24 NOTE — PLAN OF CARE
The patient is awake but beginning to relax.  He requested 2nd ativan to be able to sleep.  Stated he has bad insomnia.  No bleeding or drainage noted on the dressing at the cath site.  Will continue to monitor.

## 2020-04-24 NOTE — PROGRESS NOTES
Cardiology Progress  Note      Patient Care Team:  Provider, No Known as PCP - General      PATIENT IDENTIFICATION    Name:@ Age: 59 y.o. Sex:@ : @ MRN:@    REASON FOR FOLLOW-UP:  Chest pain  Post left heart catheterization        SUBJECTIVE    Seen and examined.  Chart and labs reviewed.  Patient reports he had some mild midsternal chest discomfort during the night with feeling of inability to take a deep breath.  He was given sublingual nitroglycerin with relief of the chest discomfort.  He was then given nebulizer treatment for his breathing which eventually did resolve his symptoms. He reports some mild tenderness at cath site.  Case discussed with admitting service.      REVIEW OF SYSTEMS:  Pertinent items are noted in HPI, all other systems reviewed and negative    OBJECTIVE    met with patient yesterday and he verbalized desire to be a DNR.  Patient up walking in room without difficulty    ASSESSMENT/PLAN    Chest pain    Depression    Elevated liver enzymes    Alcohol abuse    Essential hypertension    Mixed hyperlipidemia    Hypertriglyceridemia    Obesity, Class III, BMI 40-49.9 (morbid obesity) (CMS/HCC)    Empty sella syndrome (CMS/HCC)    Cardiomyopathy (CMS/HCC)  Mild, nonobstructive coronary disease  Abnormal EKG with right bundle branch block  Abnormal myocardial perfusion study suggestive of inferior ischemia  Hypertension  Hyperlipidemia with marked hypertriglyceridemia     Triglyceride levels of 1063  Obesity  History of traumatic brain injury in the past    Plan  Recommend aggressive risk factor modification- patient would benefit from statin therapy while closely monitoring LFTs.  On atorvastatin 40 mg daily  Patient underwent heart catheterization 2020 for abnormal stress testing with mild nonocclusive disease noted, preserved LV function with EF 60%.  Recommend compliance with medical therapy and good follow-up given patient's history of significant  "comorbidities.  Continue aspirin and statin  Patient has grossly elevated triglycerides at 1063.  Will likely need Repatha or Praluent in addition to aggressive diet modification  Continue with Hytrin  CV status stable for discharge when okay with other disciplines  Follow-up our office 2 weeks      Vital Signs  Visit Vitals  BP 98/97 (BP Location: Left arm, Patient Position: Lying)   Pulse 63   Temp 97.6 °F (36.4 °C) (Oral)   Resp 18   Ht 177.8 cm (70\")   Wt 133 kg (293 lb 14 oz)   SpO2 100%   BMI 42.17 kg/m²     Oxygen Therapy  SpO2: 100 %  Pulse Oximetry Type: Intermittent  Device (Oxygen Therapy): room air  Device (Oxygen Therapy): nasal cannula  Flow (L/min): 2  Flowsheet Rows      First Filed Value   Admission Height  177.8 cm (70\") Documented at 04/21/2020 2252   Admission Weight  133 kg (294 lb 1.5 oz) Documented at 04/21/2020 2252        Intake & Output (last 3 days)       04/21 0701 - 04/22 0700 04/22 0701 - 04/23 0700 04/23 0701 - 04/24 0700 04/24 0701 - 04/25 0700    P.O.  720 520     I.V. (mL/kg)   500 (3.8)     Total Intake(mL/kg)  720 (5.4) 1020 (7.7)     Net  +720 +1020             Urine Unmeasured Occurrence   2 x         Lines, Drains & Airways    Active LDAs     Name:   Placement date:   Placement time:   Site:   Days:    Peripheral IV 04/21/20 2304 Right Arm   04/21/20 2304    Arm   2                       BP 98/97 (BP Location: Left arm, Patient Position: Lying)   Pulse 63   Temp 97.6 °F (36.4 °C) (Oral)   Resp 18   Ht 177.8 cm (70\")   Wt 133 kg (293 lb 14 oz)   SpO2 100%   BMI 42.17 kg/m²   Intake/Output last 3 shifts:  I/O last 3 completed shifts:  In: 1500 [P.O.:1000; I.V.:500]  Out: -   Intake/Output this shift:  No intake/output data recorded.    PHYSICAL EXAM:    General: Well-developed, obese 59-year-old  male with BMI 42.17 who is alert, cooperative, no distress, appears stated age  Head:  Normocephalic, atraumatic, mucous membranes moist  Eyes:  Conjunctiva/corneas " clear, EOM's intact     Neck:  Supple,  no adenopathy;      Lungs: Clear to auscultation bilaterally, no wheezes rhonchi rales are noted  Chest wall: No tenderness  Heart::  Regular rate and rhythm, S1 and S2 normal, no murmur, rub or gallop.  Groin site soft, mildly tender with no hematoma palpable  Abdomen: Soft, non-tender, nondistended bowel sounds active  Extremities: No cyanosis, clubbing, or edema   Pulses: 2+ and symmetric all extremities  Skin:  No rashes or lesions  Neuro/psych: A&O x3. CN II through XII are grossly intact with appropriate affect      Scheduled Meds:        aspirin 324 mg Oral Once   And      aspirin 81 mg Oral Daily   atorvastatin 40 mg Oral Nightly   sodium chloride 3 mL Intravenous Q12H   terazosin 2 mg Oral Nightly       Continuous Infusions:         PRN Meds:    •  [DISCONTINUED] acetaminophen **OR** acetaminophen **OR** acetaminophen  •  acetaminophen  •  aluminum-magnesium hydroxide-simethicone  •  atropine  •  bisacodyl  •  diphenhydrAMINE  •  HYDROcodone-acetaminophen  •  ipratropium-albuterol  •  LORazepam **OR** LORazepam **OR** LORazepam **OR** LORazepam **OR** LORazepam **OR** LORazepam  •  magnesium hydroxide  •  melatonin  •  nitroglycerin  •  ondansetron **OR** ondansetron  •  ondansetron  •  sodium chloride  •  sodium chloride  •  sodium chloride        Results Review:     I reviewed the patient's new clinical results.    CBC    Results from last 7 days   Lab Units 04/24/20  0404 04/23/20  2041 04/23/20  0332 04/22/20  0723 04/21/20  2305   WBC 10*3/mm3 7.70 7.50 8.40 8.00 11.10*   HEMOGLOBIN g/dL 13.3 14.1 14.3 13.1 15.0   PLATELETS 10*3/mm3 182 193 203 198 244     Cr Clearance Estimated Creatinine Clearance: 103.2 mL/min (by C-G formula based on SCr of 1.07 mg/dL).  Coag   Results from last 7 days   Lab Units 04/23/20  2041   INR  1.01     HbA1C   Lab Results   Component Value Date    HGBA1C 5.6 04/23/2020     Blood Glucose No results found for: POCGLU  Infection     CMP  Results from last 7 days   Lab Units 04/24/20  0404 04/23/20 2041 04/23/20  0332 04/22/20 0723 04/21/20  2305   SODIUM mmol/L 140 140 136 138 138   POTASSIUM mmol/L 4.2 3.9 4.0 3.9 4.0   CHLORIDE mmol/L 104 103 106 104 101   CO2 mmol/L 25.0 23.0 21.0* 22.0 22.0   BUN mg/dL 14 12 16 21* 15   CREATININE mg/dL 1.07 1.05 1.08 1.20 1.26   GLUCOSE mg/dL 104* 122* 98 96 120*   ALBUMIN g/dL  --   --  3.60  --  4.00   BILIRUBIN mg/dL  --   --  0.5  --  0.3   ALK PHOS U/L  --   --  92  --  127*   AST (SGOT) U/L  --   --  21  --  52*   ALT (SGPT) U/L  --   --  41  --  53*     ABG      UA      USHA  No results found for: POCMETH, POCAMPHET, POCBARBITUR, POCBENZO, POCCOCAINE, POCOPIATES, POCOXYCODO, POCPHENCYC, POCPROPOXY, POCTHC, POCTRICYC  Lysis Labs Results from last 7 days   Lab Units 04/24/20 0404 04/23/20 2041 04/23/20 0332 04/22/20  0723 04/21/20  2305   INR   --  1.01  --   --   --    HEMOGLOBIN g/dL 13.3 14.1 14.3 13.1 15.0   PLATELETS 10*3/mm3 182 193 203 198 244   CREATININE mg/dL 1.07 1.05 1.08 1.20 1.26     Radiology(recent) Ct Head Without Contrast    Result Date: 4/23/2020  Normal noncontrast CT brain  Electronically Signed By-Manoj Miramontes On:4/23/2020 8:15 AM This report was finalized on [id number] by  Manoj Miramontes, .        Results from last 7 days   Lab Units 04/24/20 0404   TROPONIN T ng/mL <0.010       Xrays, labs reviewed personally by physician.    ECG/EMG Results (most recent)     Procedure Component Value Units Date/Time    ECG 12 Lead [[id number]] Collected:  04/21/20 2258     Updated:  04/21/20 2300    Narrative:       HEART RATE= 89  bpm  RR Interval= 672  ms  IA Interval= 203  ms  P Horizontal Axis= 20  deg  P Front Axis= 31  deg  QRSD Interval= 153  ms  QT Interval= 430  ms  QRS Axis= 42  deg  T Wave Axis= 8  deg  - ABNORMAL ECG -  Sinus rhythm  Borderline prolonged IA interval  Right bundle branch block  Electronically Signed By:   Date and Time of Study: 2020-04-21 22:58:46    ECG 12 Lead  [[id number]] Collected:  04/22/20 1723     Updated:  04/22/20 1724    Narrative:       HEART RATE= 72  bpm  RR Interval= 836  ms  MO Interval= 178  ms  P Horizontal Axis= 23  deg  P Front Axis= -6  deg  QRSD Interval= 158  ms  QT Interval= 459  ms  QRS Axis= 42  deg  T Wave Axis= 9  deg  - ABNORMAL ECG -  Sinus rhythm  Right bundle branch block  Electronically Signed By:   Date and Time of Study: 2020-04-22 17:23:19    Adult Transthoracic Echo Complete W/ Cont if Necessary Per Protocol [[id number]] Collected:  04/22/20 0835     Updated:  04/23/20 1840     BSA 2.5 m^2      RVIDd 2.7 cm      IVSd 1.1 cm      LVIDd 5.2 cm      LVIDs 3.8 cm      LVPWd 1.4 cm      IVS/LVPW 0.82     FS 26.3 %      EDV(Teich) 128.8 ml      ESV(Teich) 62.9 ml      EF(Teich) 51.2 %      EDV(cubed) 139.6 ml      ESV(cubed) 55.9 ml      EF(cubed) 60.0 %      LV mass(C)d 256.6 grams      LV mass(C)dI 104.4 grams/m^2      SV(Teich) 65.9 ml      SI(Teich) 26.8 ml/m^2      SV(cubed) 83.7 ml      SI(cubed) 34.0 ml/m^2      Ao root diam 3.6 cm      Ao root area 10.1 cm^2      ACS 2.3 cm      LVOT diam 2.3 cm      LVOT area 4.3 cm^2      RVOT diam 2.0 cm      RVOT area 3.2 cm^2      EDV(MOD-sp4) 125.7 ml      ESV(MOD-sp4) 40.5 ml      EF(MOD-sp4) 67.8 %      SV(MOD-sp4) 85.2 ml      SI(MOD-sp4) 34.7 ml/m^2      Ao root area (BSA corrected) 1.5     LV [place] Vol (BSA corrected) 51.1 ml/m^2      LV Sys Vol (BSA corrected) 16.5 ml/m^2      Aortic R-R 0.99 sec      Aortic HR 60.6 BPM      MV E max kiel 91.8 cm/sec      MV A max kiel 78.3 cm/sec      MV E/A 1.2     MV V2 max 80.0 cm/sec      MV max PG 2.6 mmHg      MV V2 mean 50.4 cm/sec      MV mean PG 1.2 mmHg      MV V2 VTI 27.6 cm      MVA(VTI) 3.2 cm^2      MV dec slope 342.6 cm/sec^2      MV dec time 0.27 sec      Ao pk kiel 109.3 cm/sec      Ao max PG 4.8 mmHg      Ao max PG (full) 1.7 mmHg      Ao V2 mean 78.0 cm/sec      Ao mean PG 2.6 mmHg      Ao mean PG (full) 1.1 mmHg      Ao V2 VTI 23.5 cm       ISIDORO(I,A) 3.8 cm^2      ISIDORO(I,D) 3.8 cm^2      ISIDORO(V,A) 3.5 cm^2      ISIDORO(V,D) 3.5 cm^2      LV V1 max PG 3.1 mmHg      LV V1 mean PG 1.6 mmHg      LV V1 max 87.8 cm/sec      LV V1 mean 57.6 cm/sec      LV V1 VTI 20.6 cm      CO(Ao) 14.4 l/min      CI(Ao) 5.9 l/min/m^2      SV(Ao) 237.2 ml      SI(Ao) 96.5 ml/m^2      CO(LVOT) 5.4 l/min      CI(LVOT) 2.2 l/min/m^2      SV(LVOT) 89.1 ml      SV(RVOT) 51.0 ml      SI(LVOT) 36.2 ml/m^2      PA V2 max 78.5 cm/sec      PA max PG 2.5 mmHg      PA max PG (full) 0.27 mmHg       CV ECHO DASHAWN - PVA(V,A) 3.0 cm^2       CV ECHO DASHAWN - PVA(V,D) 3.0 cm^2      RV V1 max PG 2.2 mmHg      RV V1 mean PG 1.2 mmHg      RV V1 max 74.0 cm/sec      RV V1 mean 51.6 cm/sec      RV V1 VTI 15.9 cm      Pulm Sys Alex 40.2 cm/sec      Pulm [place] Alex 42.2 cm/sec      Pulm S/D 0.95     Qp/Qs 0.57     Pulm A Revs Dur 0.1 sec      Pulm A Revs Alex 20.5 cm/sec       CV ECHO DASHAWN - BZI_BMI 42.2 kilograms/m^2       [facility] 2.6 m^2       CV ECHO DASHAWN - BZI_METRIC_WEIGHT 133.4 kg       CV ECHO DASHAWN - BZI_METRIC_HEIGHT 177.8 cm      EF(MOD-bp) 68.0 %      LA dimension(2D) 4.1 cm     Narrative:       · Left ventricular systolic function is normal.  · Estimated EF appears to be in the range of 66 - 70%.  · Left atrial cavity size is mildly dilated.  · The study is technically difficult for diagnosis.       ECG 12 Lead [[id number]] Collected:  04/24/20 0301     Updated:  04/24/20 0302    Narrative:       HEART RATE= 70  bpm  RR Interval= 864  ms  NC Interval= 175  ms  P Horizontal Axis= 6  deg  P Front Axis= 34  deg  QRSD Interval= 155  ms  QT Interval= 463  ms  QRS Axis= 63  deg  T Wave Axis= 2  deg  - ABNORMAL ECG -  Sinus rhythm  Right bundle branch block  Electronically Signed By:   Date and Time of Study: 2020-04-24 03:01:43            Medication Review:   I have reviewed the patient's current medication list  Scheduled Meds:  aspirin 324 mg Oral Once   And      aspirin 81 mg  Oral Daily   atorvastatin 40 mg Oral Nightly   sodium chloride 3 mL Intravenous Q12H   terazosin 2 mg Oral Nightly     Continuous Infusions:   PRN Meds:.•  [DISCONTINUED] acetaminophen **OR** acetaminophen **OR** acetaminophen  •  acetaminophen  •  aluminum-magnesium hydroxide-simethicone  •  atropine  •  bisacodyl  •  diphenhydrAMINE  •  HYDROcodone-acetaminophen  •  ipratropium-albuterol  •  LORazepam **OR** LORazepam **OR** LORazepam **OR** LORazepam **OR** LORazepam **OR** LORazepam  •  magnesium hydroxide  •  melatonin  •  nitroglycerin  •  ondansetron **OR** ondansetron  •  ondansetron  •  sodium chloride  •  sodium chloride  •  sodium chloride    Imaging:  Imaging Results (Last 72 Hours)     Procedure Component Value Units Date/Time    CT Head Without Contrast [[id number]] Collected:  04/23/20 0813     Updated:  04/23/20 0830    Narrative:          DATE OF EXAM:  4/23/2020 7:56 AM     PROCEDURE:   CT HEAD WO CONTRAST-     INDICATIONS:   Head trauma, subacute-chronic, neuro/cognitive deficit; R07.9-Chest  pain, unspecified; I10-Essential (primary) hypertension     COMPARISON:  No Comparisons Available     TECHNIQUE:   Routine transaxial and coronal reconstruction images were obtained  through the head without the administration of contrast. Automated  exposure control and iterative reconstruction methods were used.     FINDINGS:  The ventricles and CSF containing spaces are normal in size and  configuration for the patient's age. No intra or extra-axial mass  lesions, fluid collections, or mass effect are seen. No focal areas of  low attenuation or evidence of acute hemorrhage. No fractures are  identified.        Impression:       Normal noncontrast CT brain     Electronically Signed By-Manoj Miramontes On:4/23/2020 8:15 AM  This report was finalized on [id number] by  Manoj Miramontes, .    XR Chest 1 View [[id number]] Collected:  04/21/20 2339     Updated:  04/21/20 2341    Narrative:          DATE OF EXAM:  "  4/21/2020 11:02 PM     PROCEDURE:   XR CHEST 1 VW-     INDICATIONS:   Chest pain protocol     COMPARISON:  No Comparisons Available     TECHNIQUE:   Portable chest radiograph.     FINDINGS:    The cardiomediastinal silhouette is within normal limits. The lungs are  clear. There is no pneumothorax, focal consolidation, or large pleural  effusion. Osseous structures grossly intact.        Impression:       No acute process.      Electronically Signed By-Rajesh Nair On:4/21/2020 11:39 PM  This report was finalized on [id number] by  Rajesh Nair, .            ISMA Dan  04/24/20  08:09       EMR Dragon/Transcription:   \"Dictated utilizing Dragon dictation\".           Electronically signed by ISMA Dan, 04/24/20, 8:09 AM.    "

## 2020-04-24 NOTE — PROGRESS NOTES
Discharge Planning Assessment   [facility]     Patient Name: Gabriel Medrano  MRN: [id number]  Today's Date: 4/24/2020    Admit Date: 4/21/2020          Plan    Plan Comments  -- frank abdi to follow for information regarding alcohol abuse and information regarding following a primary md       Carol naegele rn  Case management  Office number [phone]  Cell phone [phone]

## 2020-04-24 NOTE — NURSING NOTE
Assessed the site after the patient was ambulated and in the chair.  The tissue soft and non-tender and no discoloration.  No s/s of distress.  The dressing is clean, dry, and in tact.

## 2020-04-24 NOTE — NURSING NOTE
Assessed the area after the patient returned to bed.  Soft and non-tender when palpated and no discoloration.  Dressing is clean, dry, and in tact.
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
## 2020-04-24 NOTE — NURSING NOTE
Dressing CDI, palpated the area around the site and tissue is soft without discoloration.  The patient did not c/o pain and did not appear to be in distress. When asked he stated it's not bad.

## 2020-04-24 NOTE — CONSULTS
Cardiac rehab evaluation acknowledged. Not appropriate candidate/diagnosis for phase II insurance coverage at this time.
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
## 2020-04-24 NOTE — PLAN OF CARE
Problem: Cardiac: ACS (Acute Coronary Syndrome) (Adult)  Goal: Signs and Symptoms of Listed Potential Problems Will be Absent, Minimized or Managed (Cardiac: ACS)  Outcome: Ongoing (interventions implemented as appropriate)  Note:   Patient scoring 1 on the CIWA scale. No complaints of chest pain will continue to monitor

## 2020-04-24 NOTE — NURSING NOTE
Assessed the site from the cath and the dressing is Clean, dry, and in tact.  Palpated the area all around the site and no c/o pain or discomfort.  Tissue soft and non-tender.  No discoloration @ this time.

## 2020-04-24 NOTE — NURSING NOTE
The patient ambulating in the hallway told the NA he was having chest pain.  He was given Nitroglycerin x 1.  After 5 minutes he stated the chest pain was better but he still felt short of air.  Stat EKG was ordered and the charge nurses were in the room to assess.  Call placed to the nurse practitioner @ this time to see if she wants a new chest xray or has any orders.

## 2020-04-25 LAB — GLUCOSE BLDC GLUCOMTR-MCNC: 107 MG/DL (ref 70–105)

## 2020-04-25 PROCEDURE — 99231 SBSQ HOSP IP/OBS SF/LOW 25: CPT | Performed by: PHYSICIAN ASSISTANT

## 2020-04-25 PROCEDURE — 99232 SBSQ HOSP IP/OBS MODERATE 35: CPT | Performed by: INTERNAL MEDICINE

## 2020-04-25 PROCEDURE — 82962 GLUCOSE BLOOD TEST: CPT

## 2020-04-25 RX ADMIN — SERTRALINE HYDROCHLORIDE 25 MG: 50 TABLET ORAL at 08:57

## 2020-04-25 RX ADMIN — ASPIRIN 81 MG: 81 TABLET, COATED ORAL at 08:57

## 2020-04-25 RX ADMIN — Medication 3 ML: at 21:44

## 2020-04-25 RX ADMIN — ATORVASTATIN CALCIUM 40 MG: 40 TABLET, FILM COATED ORAL at 21:41

## 2020-04-25 RX ADMIN — TERAZOSIN HYDROCHLORIDE 2 MG: 2 CAPSULE ORAL at 21:41

## 2020-04-25 RX ADMIN — ZOLPIDEM TARTRATE 5 MG: 5 TABLET, COATED ORAL at 00:43

## 2020-04-25 RX ADMIN — LORAZEPAM 2 MG: 1 TABLET ORAL at 00:52

## 2020-04-25 RX ADMIN — Medication 3 ML: at 08:57
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
## 2020-04-25 NOTE — PLAN OF CARE
Problem: Cardiac: ACS (Acute Coronary Syndrome) (Adult)  Goal: Signs and Symptoms of Listed Potential Problems Will be Absent, Minimized or Managed (Cardiac: ACS)  Outcome: Ongoing (interventions implemented as appropriate)  Flowsheets (Taken 4/22/2020 0342 by Justus Armando LPN)  Problems Assessed (Acute Coronary Syndrome): chest pain (angina);situational response  Problems Present (Acute Coronary Syn): chest pain (angina);situational response  Note:   Pt      Problem: Patient Care Overview  Goal: Plan of Care Review  Outcome: Ongoing (interventions implemented as appropriate)  Goal: Individualization and Mutuality  Outcome: Ongoing (interventions implemented as appropriate)  Goal: Discharge Needs Assessment  Outcome: Ongoing (interventions implemented as appropriate)  Goal: Interprofessional Rounds/Family Conf  Outcome: Ongoing (interventions implemented as appropriate)

## 2020-04-25 NOTE — PLAN OF CARE
Problem: Cardiac: ACS (Acute Coronary Syndrome) (Adult)  Goal: Signs and Symptoms of Listed Potential Problems Will be Absent, Minimized or Managed (Cardiac: ACS)  Description  Signs and symptoms of listed potential problems will be absent, minimized or managed by discharge/transition of care (reference Cardiac: ACS (Acute Coronary Syndrome) (Adult) CPG).  Outcome: Ongoing (interventions implemented as appropriate)     Problem: Patient Care Overview  Goal: Plan of Care Review  Outcome: Ongoing (interventions implemented as appropriate)  Goal: Individualization and Mutuality  Outcome: Ongoing (interventions implemented as appropriate)  Goal: Discharge Needs Assessment  Outcome: Ongoing (interventions implemented as appropriate)  Goal: Interprofessional Rounds/Family Conf  Outcome: Ongoing (interventions implemented as appropriate)
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
## 2020-04-26 ENCOUNTER — APPOINTMENT (OUTPATIENT)
Dept: GENERAL RADIOLOGY | Facility: HOSPITAL | Age: 60
End: 2020-04-26

## 2020-04-26 LAB
ANION GAP SERPL CALCULATED.3IONS-SCNC: 13 MMOL/L (ref 5–15)
ARTERIAL PATENCY WRIST A: POSITIVE
ATMOSPHERIC PRESS: NORMAL MM[HG]
BASE EXCESS BLDA CALC-SCNC: 0.5 MMOL/L (ref 0–3)
BDY SITE: NORMAL
BUN BLD-MCNC: 14 MG/DL (ref 6–20)
BUN/CREAT SERPL: 12.6 (ref 7–25)
CALCIUM SPEC-SCNC: 9.1 MG/DL (ref 8.6–10.5)
CHLORIDE SERPL-SCNC: 104 MMOL/L (ref 98–107)
CO2 BLDA-SCNC: 25.4 MMOL/L (ref 22–29)
CO2 SERPL-SCNC: 21 MMOL/L (ref 22–29)
CREAT BLD-MCNC: 1.11 MG/DL (ref 0.76–1.27)
DEPRECATED RDW RBC AUTO: 41.6 FL (ref 37–54)
ERYTHROCYTE [DISTWIDTH] IN BLOOD BY AUTOMATED COUNT: 13.5 % (ref 12.3–15.4)
GFR SERPL CREATININE-BSD FRML MDRD: 68 ML/MIN/1.73
GLUCOSE BLD-MCNC: 120 MG/DL (ref 65–99)
HCO3 BLDA-SCNC: 24.3 MMOL/L (ref 21–28)
HCT VFR BLD AUTO: 40.4 % (ref 37.5–51)
HEMODILUTION: NO
HGB BLD-MCNC: 13.9 G/DL (ref 13–17.7)
HOROWITZ INDEX BLD+IHG-RTO: 21 %
MCH RBC QN AUTO: 29.9 PG (ref 26.6–33)
MCHC RBC AUTO-ENTMCNC: 34.5 G/DL (ref 31.5–35.7)
MCV RBC AUTO: 86.8 FL (ref 79–97)
MODALITY: NORMAL
NT-PROBNP SERPL-MCNC: 40.9 PG/ML (ref 5–900)
PCO2 BLDA: 35.9 MM HG (ref 35–48)
PH BLDA: 7.44 PH UNITS (ref 7.35–7.45)
PLATELET # BLD AUTO: 196 10*3/MM3 (ref 140–450)
PMV BLD AUTO: 8.1 FL (ref 6–12)
PO2 BLDA: 83.3 MM HG (ref 83–108)
POTASSIUM BLD-SCNC: 4.4 MMOL/L (ref 3.5–5.2)
RBC # BLD AUTO: 4.66 10*6/MM3 (ref 4.14–5.8)
SAO2 % BLDCOA: 96.7 % (ref 94–98)
SODIUM BLD-SCNC: 138 MMOL/L (ref 136–145)
WBC NRBC COR # BLD: 7.4 10*3/MM3 (ref 3.4–10.8)

## 2020-04-26 PROCEDURE — 71046 X-RAY EXAM CHEST 2 VIEWS: CPT

## 2020-04-26 PROCEDURE — 83880 ASSAY OF NATRIURETIC PEPTIDE: CPT | Performed by: INTERNAL MEDICINE

## 2020-04-26 PROCEDURE — 36600 WITHDRAWAL OF ARTERIAL BLOOD: CPT

## 2020-04-26 PROCEDURE — 99232 SBSQ HOSP IP/OBS MODERATE 35: CPT | Performed by: INTERNAL MEDICINE

## 2020-04-26 PROCEDURE — 82803 BLOOD GASES ANY COMBINATION: CPT

## 2020-04-26 PROCEDURE — 80048 BASIC METABOLIC PNL TOTAL CA: CPT | Performed by: INTERNAL MEDICINE

## 2020-04-26 PROCEDURE — 85027 COMPLETE CBC AUTOMATED: CPT | Performed by: INTERNAL MEDICINE

## 2020-04-26 PROCEDURE — 25010000002 LORAZEPAM PER 2 MG: Performed by: INTERNAL MEDICINE

## 2020-04-26 RX ORDER — LANOLIN ALCOHOL/MO/W.PET/CERES
1000 CREAM (GRAM) TOPICAL DAILY
Status: DISCONTINUED | OUTPATIENT
Start: 2020-04-26 | End: 2020-04-27 | Stop reason: HOSPADM

## 2020-04-26 RX ADMIN — LORAZEPAM 1 MG: 1 TABLET ORAL at 21:40

## 2020-04-26 RX ADMIN — TERAZOSIN HYDROCHLORIDE 2 MG: 2 CAPSULE ORAL at 21:30

## 2020-04-26 RX ADMIN — Medication 10 ML: at 21:30

## 2020-04-26 RX ADMIN — ZOLPIDEM TARTRATE 5 MG: 5 TABLET, COATED ORAL at 00:50

## 2020-04-26 RX ADMIN — LORAZEPAM 2 MG: 2 INJECTION INTRAMUSCULAR; INTRAVENOUS at 12:16

## 2020-04-26 RX ADMIN — CYANOCOBALAMIN TAB 1000 MCG 1000 MCG: 1000 TAB at 12:15

## 2020-04-26 RX ADMIN — LORAZEPAM 1 MG: 1 TABLET ORAL at 01:17

## 2020-04-26 RX ADMIN — ATORVASTATIN CALCIUM 40 MG: 40 TABLET, FILM COATED ORAL at 21:30

## 2020-04-26 RX ADMIN — ACETAMINOPHEN 650 MG: 325 TABLET, FILM COATED ORAL at 08:07

## 2020-04-26 RX ADMIN — ZOLPIDEM TARTRATE 5 MG: 5 TABLET, COATED ORAL at 23:34

## 2020-04-26 RX ADMIN — LORAZEPAM 1 MG: 2 INJECTION INTRAMUSCULAR; INTRAVENOUS at 14:41

## 2020-04-26 RX ADMIN — SERTRALINE HYDROCHLORIDE 25 MG: 50 TABLET ORAL at 08:07

## 2020-04-26 RX ADMIN — MAGNESIUM HYDROXIDE 10 ML: 2400 SUSPENSION ORAL at 08:09

## 2020-04-26 RX ADMIN — Medication 3 ML: at 08:00

## 2020-04-26 RX ADMIN — ASPIRIN 81 MG: 81 TABLET, COATED ORAL at 08:07
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
## 2020-04-26 NOTE — PLAN OF CARE
Problem: Patient Care Overview  Goal: Plan of Care Review  4/26/2020 0457 by Alejandra Hathaway LPN  Outcome: Ongoing (interventions implemented as appropriate)  Flowsheets (Taken 4/26/2020 0457)  Plan of Care Reviewed With: patient  Outcome Summary: patient has no new complains, ciwa scored every 4 hrs, will continue to monitor
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
## 2020-04-26 NOTE — PLAN OF CARE
Problem: Cardiac: ACS (Acute Coronary Syndrome) (Adult)  Goal: Signs and Symptoms of Listed Potential Problems Will be Absent, Minimized or Managed (Cardiac: ACS)  Outcome: Ongoing (interventions implemented as appropriate)     Problem: Patient Care Overview  Goal: Plan of Care Review  Outcome: Ongoing (interventions implemented as appropriate)  Flowsheets  Taken 4/26/2020 1523  Progress: no change  Outcome Summary: Patient eduacated about CIWA. Patients CIWA score has risen throughout the day but has been responsive to treatment. Patient still remains anxious. Will continue to monitor  Taken 4/26/2020 0700  Plan of Care Reviewed With: patient     Problem: Suicide Risk (Adult)  Goal: Identify Related Risk Factors and Signs and Symptoms  Outcome: Ongoing (interventions implemented as appropriate)  Flowsheets (Taken 4/26/2020 1523)  Signs and Symptoms (Suicide Risk): anxiety  Goal: Strength-Based Wellness/Recovery  Outcome: Ongoing (interventions implemented as appropriate)  Flowsheets (Taken 4/26/2020 1523)  Strength-Based Wellness/Recovery: making progress toward outcome  Goal: Physical Safety  Outcome: Ongoing (interventions implemented as appropriate)  Flowsheets (Taken 4/26/2020 1523)  Physical Safety: making progress toward outcome

## 2020-04-27 VITALS
WEIGHT: 292.55 LBS | SYSTOLIC BLOOD PRESSURE: 120 MMHG | HEIGHT: 70 IN | DIASTOLIC BLOOD PRESSURE: 82 MMHG | TEMPERATURE: 97.6 F | OXYGEN SATURATION: 94 % | HEART RATE: 69 BPM | RESPIRATION RATE: 16 BRPM | BODY MASS INDEX: 41.88 KG/M2

## 2020-04-27 PROCEDURE — 93010 ELECTROCARDIOGRAM REPORT: CPT | Performed by: INTERNAL MEDICINE

## 2020-04-27 PROCEDURE — 99238 HOSP IP/OBS DSCHRG MGMT 30/<: CPT | Performed by: FAMILY MEDICINE

## 2020-04-27 PROCEDURE — 99231 SBSQ HOSP IP/OBS SF/LOW 25: CPT | Performed by: PHYSICIAN ASSISTANT

## 2020-04-27 RX ORDER — TERAZOSIN 2 MG/1
2 CAPSULE ORAL NIGHTLY
Qty: 30 CAPSULE | Refills: 0 | Status: SHIPPED | OUTPATIENT
Start: 2020-04-27 | End: 2020-08-05

## 2020-04-27 RX ORDER — SERTRALINE HYDROCHLORIDE 25 MG/1
25 TABLET, FILM COATED ORAL DAILY
Qty: 30 TABLET | Refills: 0 | Status: SHIPPED | OUTPATIENT
Start: 2020-04-28 | End: 2020-08-05

## 2020-04-27 RX ORDER — ATORVASTATIN CALCIUM 40 MG/1
40 TABLET, FILM COATED ORAL NIGHTLY
Qty: 30 TABLET | Refills: 0 | Status: SHIPPED | OUTPATIENT
Start: 2020-04-27 | End: 2020-08-05

## 2020-04-27 RX ORDER — PRAMIPEXOLE DIHYDROCHLORIDE 0.12 MG/1
0.12 TABLET ORAL NIGHTLY
Qty: 30 TABLET | Refills: 0 | Status: SHIPPED | OUTPATIENT
Start: 2020-04-27 | End: 2020-08-05

## 2020-04-27 RX ADMIN — Medication 3 ML: at 08:44

## 2020-04-27 RX ADMIN — CYANOCOBALAMIN TAB 1000 MCG 1000 MCG: 1000 TAB at 08:36

## 2020-04-27 RX ADMIN — ASPIRIN 81 MG: 81 TABLET, COATED ORAL at 08:36

## 2020-04-27 RX ADMIN — SERTRALINE HYDROCHLORIDE 25 MG: 50 TABLET ORAL at 08:36
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
## 2020-04-27 NOTE — PROGRESS NOTES
Discharge Planning Assessment   [place]     Patient Name: Gabriel Medrano  MRN: [id number]  Today's Date: 4/27/2020    Admit Date: 4/21/2020          Plan    Patient/Family in Agreement with Plan  -- patient to return home; no needs voiced       Carol naegele rn  Case management  Office number [phone]  Cell phone [phone]

## 2020-04-27 NOTE — PROGRESS NOTES
Case Management/Social Work    Patient Name:  Gabriel Medrano  YOB: 1960  MRN: [id number]  Admit Date:  4/21/2020      Sw spoke to pt by phone,states he already set up a PCP at dc with Md in [place].Sw attempted to discuss etoh use but pt declined discusion and  need for any  Substance abuse resources at this time.    No barrier to dc identified.    Juliane Espinoza, [facility], LSW  [phone]      Electronically signed by:  Massiel Espinoza  04/27/20 11:48
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
## 2020-04-27 NOTE — PLAN OF CARE
Problem: Patient Care Overview  Goal: Plan of Care Review  Outcome: Ongoing (interventions implemented as appropriate)  Flowsheets (Taken 4/27/2020 0423)  Progress: improving  Plan of Care Reviewed With: patient  Outcome Summary: Patient remains anxious but CIWA scale has been lower than on dayshift. Patient denies any chest pain. Will continue to monitor

## 2020-04-28 ENCOUNTER — READMISSION MANAGEMENT (OUTPATIENT)
Dept: CALL CENTER | Facility: HOSPITAL | Age: 60
End: 2020-04-28

## 2020-04-28 NOTE — OUTREACH NOTE
Prep Survey      Responses   [facility]?  [facility]   Is LACE score < 7 ?  No   Eligibility  Readm Mgmt   Discharge diagnosis  Stable angina   COVID-19 Test Status  Not tested   Does the patient have one of the following disease processes/diagnoses(primary or secondary)?  Other   Does the patient have Home health ordered?  No   Is there a DME ordered?  No   Comments regarding appointments  Follow up needs to be set   Medication alerts for this patient  started on aspirin   Prep survey completed?  Yes          Jacquelyn Cox RN

## 2020-04-29 ENCOUNTER — READMISSION MANAGEMENT (OUTPATIENT)
Dept: CALL CENTER | Facility: HOSPITAL | Age: 60
End: 2020-04-29

## 2020-04-29 NOTE — OUTREACH NOTE
Medical Week 1 Survey      Responses   [facility] patient discharged from?  [facility]   COVID-19 Test Status  Not tested   Does the patient have one of the following disease processes/diagnoses(primary or secondary)?  Other   Is there a successful TCM telephone encounter documented?  No   Week 1 attempt successful?  No   Unsuccessful attempts  Attempt 1          Mp Richards RN

## 2020-04-30 ENCOUNTER — READMISSION MANAGEMENT (OUTPATIENT)
Dept: CALL CENTER | Facility: HOSPITAL | Age: 60
End: 2020-04-30

## 2020-04-30 NOTE — OUTREACH NOTE
Medical Week 1 Survey      Responses   [facility] patient discharged from?  [facility]   COVID-19 Test Status  Not tested   Does the patient have one of the following disease processes/diagnoses(primary or secondary)?  Other   Is there a successful TCM telephone encounter documented?  No   Week 1 attempt successful?  No   Unsuccessful attempts  Attempt 2          Vicenta Morrissey RN

## 2020-05-01 ENCOUNTER — READMISSION MANAGEMENT (OUTPATIENT)
Dept: CALL CENTER | Facility: HOSPITAL | Age: 60
End: 2020-05-01

## 2020-05-01 NOTE — OUTREACH NOTE
Medical Week 1 Survey      Responses   [facility] patient discharged from?  [facility]   COVID-19 Test Status  Not tested   Does the patient have one of the following disease processes/diagnoses(primary or secondary)?  Other   Is there a successful TCM telephone encounter documented?  No   Week 1 attempt successful?  No   Unsuccessful attempts  Attempt 3          Alma Yang RN

## 2020-05-02 PROCEDURE — 93010 ELECTROCARDIOGRAM REPORT: CPT | Performed by: INTERNAL MEDICINE

## 2020-05-08 ENCOUNTER — READMISSION MANAGEMENT (OUTPATIENT)
Dept: CALL CENTER | Facility: HOSPITAL | Age: 60
End: 2020-05-08

## 2020-05-08 NOTE — OUTREACH NOTE
Medical Week 2 Survey      Responses   [facility] [place] patient discharged from?  [facility]   COVID-19 Test Status  Not tested   Does the patient have one of the following disease processes/diagnoses(primary or secondary)?  Other   Week 2 attempt successful?  No   Unsuccessful attempts  Attempt 1          Erin Martinez RN

## 2020-05-11 ENCOUNTER — READMISSION MANAGEMENT (OUTPATIENT)
Dept: CALL CENTER | Facility: HOSPITAL | Age: 60
End: 2020-05-11

## 2020-05-11 NOTE — OUTREACH NOTE
Medical Week 2 Survey      Responses   [facility] patient discharged from?  [facility]   COVID-19 Test Status  Not tested   Does the patient have one of the following disease processes/diagnoses(primary or secondary)?  Other   Week 2 attempt successful?  No   Unsuccessful attempts  Attempt 2   Rescheduled  Revoked          Barbara Mascorro LPN

## 2020-08-05 ENCOUNTER — OFFICE VISIT (OUTPATIENT)
Dept: ENDOCRINOLOGY | Facility: CLINIC | Age: 60
End: 2020-08-05

## 2020-08-05 VITALS
WEIGHT: 278 LBS | HEART RATE: 68 BPM | OXYGEN SATURATION: 97 % | SYSTOLIC BLOOD PRESSURE: 140 MMHG | HEIGHT: 70 IN | TEMPERATURE: 98.2 F | BODY MASS INDEX: 39.8 KG/M2 | DIASTOLIC BLOOD PRESSURE: 80 MMHG

## 2020-08-05 DIAGNOSIS — E03.9 HYPOTHYROIDISM, UNSPECIFIED TYPE: Primary | ICD-10-CM

## 2020-08-05 DIAGNOSIS — E78.2 MIXED HYPERLIPIDEMIA: Chronic | ICD-10-CM

## 2020-08-05 DIAGNOSIS — R73.9 HYPERGLYCEMIA: ICD-10-CM

## 2020-08-05 DIAGNOSIS — I10 ESSENTIAL HYPERTENSION: Chronic | ICD-10-CM

## 2020-08-05 DIAGNOSIS — R53.1 WEAKNESS: ICD-10-CM

## 2020-08-05 DIAGNOSIS — N52.8 OTHER MALE ERECTILE DYSFUNCTION: ICD-10-CM

## 2020-08-05 DIAGNOSIS — E66.01 OBESITY, CLASS III, BMI 40-49.9 (MORBID OBESITY) (HCC): Chronic | ICD-10-CM

## 2020-08-05 DIAGNOSIS — E55.9 VITAMIN D DEFICIENCY: ICD-10-CM

## 2020-08-05 DIAGNOSIS — R74.8 ELEVATED LIVER ENZYMES: ICD-10-CM

## 2020-08-05 PROCEDURE — 99204 OFFICE O/P NEW MOD 45 MIN: CPT | Performed by: INTERNAL MEDICINE

## 2020-08-05 NOTE — PATIENT INSTRUCTIONS
Get fasting labs drawn soon before 10 am.  Will call you with the lab results.  Schedule appt with Dr. Gela Ball as per hospital directions.  See eye doctor.  See neurologist.  Drink plenty of water.

## 2020-08-06 ENCOUNTER — TELEPHONE (OUTPATIENT)
Dept: ENDOCRINOLOGY | Facility: CLINIC | Age: 60
End: 2020-08-06

## 2020-08-06 ENCOUNTER — LAB (OUTPATIENT)
Dept: LAB | Facility: HOSPITAL | Age: 60
End: 2020-08-06

## 2020-08-06 DIAGNOSIS — E55.9 VITAMIN D DEFICIENCY: ICD-10-CM

## 2020-08-06 DIAGNOSIS — E78.2 MIXED HYPERLIPIDEMIA: Chronic | ICD-10-CM

## 2020-08-06 DIAGNOSIS — I10 ESSENTIAL HYPERTENSION: Chronic | ICD-10-CM

## 2020-08-06 DIAGNOSIS — R73.9 HYPERGLYCEMIA: ICD-10-CM

## 2020-08-06 DIAGNOSIS — N52.8 OTHER MALE ERECTILE DYSFUNCTION: ICD-10-CM

## 2020-08-06 LAB
25(OH)D3 SERPL-MCNC: 31.1 NG/ML (ref 30–100)
ALBUMIN SERPL-MCNC: 4 G/DL (ref 3.5–5.2)
ALBUMIN/GLOB SERPL: 1.5 G/DL
ALP SERPL-CCNC: 79 U/L (ref 39–117)
ALT SERPL W P-5'-P-CCNC: 41 U/L (ref 1–41)
ANION GAP SERPL CALCULATED.3IONS-SCNC: 10.6 MMOL/L (ref 5–15)
AST SERPL-CCNC: 27 U/L (ref 1–40)
BILIRUB SERPL-MCNC: 0.4 MG/DL (ref 0–1.2)
BUN SERPL-MCNC: 15 MG/DL (ref 6–20)
BUN/CREAT SERPL: 13.2 (ref 7–25)
CALCIUM SPEC-SCNC: 9.5 MG/DL (ref 8.6–10.5)
CHLORIDE SERPL-SCNC: 105 MMOL/L (ref 98–107)
CHOLEST SERPL-MCNC: 159 MG/DL (ref 0–200)
CO2 SERPL-SCNC: 23.4 MMOL/L (ref 22–29)
CREAT SERPL-MCNC: 1.14 MG/DL (ref 0.76–1.27)
FSH SERPL-ACNC: 1.89 MIU/ML
GFR SERPL CREATININE-BSD FRML MDRD: 66 ML/MIN/1.73
GLOBULIN UR ELPH-MCNC: 2.7 GM/DL
GLUCOSE SERPL-MCNC: 98 MG/DL (ref 65–99)
HBA1C MFR BLD: 5.4 % (ref 3.5–5.6)
HDLC SERPL-MCNC: 34 MG/DL (ref 40–60)
LDLC SERPL CALC-MCNC: 103 MG/DL (ref 0–100)
LDLC/HDLC SERPL: 3.02 {RATIO}
LH SERPL-ACNC: 2.41 MIU/ML
POTASSIUM SERPL-SCNC: 4 MMOL/L (ref 3.5–5.2)
PROLACTIN SERPL-MCNC: 20.3 NG/ML (ref 4.04–15.2)
PROT SERPL-MCNC: 6.7 G/DL (ref 6–8.5)
SODIUM SERPL-SCNC: 139 MMOL/L (ref 136–145)
T4 FREE SERPL-MCNC: 1.23 NG/DL (ref 0.93–1.7)
TRIGL SERPL-MCNC: 112 MG/DL (ref 0–150)
TSH SERPL DL<=0.05 MIU/L-ACNC: 5.88 UIU/ML (ref 0.27–4.2)
VLDLC SERPL-MCNC: 22.4 MG/DL (ref 5–40)

## 2020-08-06 PROCEDURE — 80053 COMPREHEN METABOLIC PANEL: CPT

## 2020-08-06 PROCEDURE — 84439 ASSAY OF FREE THYROXINE: CPT

## 2020-08-06 PROCEDURE — 84403 ASSAY OF TOTAL TESTOSTERONE: CPT

## 2020-08-06 PROCEDURE — 83001 ASSAY OF GONADOTROPIN (FSH): CPT

## 2020-08-06 PROCEDURE — 84146 ASSAY OF PROLACTIN: CPT

## 2020-08-06 PROCEDURE — 82306 VITAMIN D 25 HYDROXY: CPT

## 2020-08-06 PROCEDURE — 84443 ASSAY THYROID STIM HORMONE: CPT

## 2020-08-06 PROCEDURE — 84402 ASSAY OF FREE TESTOSTERONE: CPT

## 2020-08-06 PROCEDURE — 36415 COLL VENOUS BLD VENIPUNCTURE: CPT

## 2020-08-06 PROCEDURE — 80061 LIPID PANEL: CPT

## 2020-08-06 PROCEDURE — 83036 HEMOGLOBIN GLYCOSYLATED A1C: CPT

## 2020-08-06 PROCEDURE — 83002 ASSAY OF GONADOTROPIN (LH): CPT

## 2020-08-08 LAB
TESTOST FREE SERPL-MCNC: 5.3 PG/ML (ref 7.2–24)
TESTOST SERPL-MCNC: 336 NG/DL (ref 264–916)

## 2020-08-09 PROBLEM — E23.6 EMPTY SELLA SYNDROME (HCC): Status: RESOLVED | Noted: 2020-04-22 | Resolved: 2020-08-09

## 2020-08-09 PROBLEM — E03.9 HYPOTHYROIDISM: Status: ACTIVE | Noted: 2020-08-09

## 2020-08-09 RX ORDER — LEVOTHYROXINE SODIUM 0.05 MG/1
TABLET ORAL
Qty: 90 TABLET | Refills: 3 | Status: SHIPPED | OUTPATIENT
Start: 2020-08-09
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
## 2020-08-09 NOTE — TELEPHONE ENCOUNTER
Noted. Thank you. They scheduled him for February.  I also noted he has a DM f/u appt with Dr. Randall in November, but he does not have diabetes & has not seen DR. SNOW.  I did not give him a f/u appt after the 8/5 NP visit & have no plans to see him in F/u.  I think that appt was put in error.

## 2020-09-14 ENCOUNTER — LAB (OUTPATIENT)
Dept: LAB | Facility: HOSPITAL | Age: 60
End: 2020-09-14

## 2020-09-14 ENCOUNTER — TRANSCRIBE ORDERS (OUTPATIENT)
Dept: ADMINISTRATIVE | Facility: HOSPITAL | Age: 60
End: 2020-09-14

## 2020-09-14 DIAGNOSIS — H53.10 EYE STRAIN: ICD-10-CM

## 2020-09-14 DIAGNOSIS — H52.13 SEVERE MYOPIA OF BOTH EYES: ICD-10-CM

## 2020-09-14 DIAGNOSIS — H52.13 SEVERE MYOPIA OF BOTH EYES: Primary | ICD-10-CM

## 2020-09-14 PROCEDURE — 36415 COLL VENOUS BLD VENIPUNCTURE: CPT

## 2020-09-14 PROCEDURE — 86592 SYPHILIS TEST NON-TREP QUAL: CPT

## 2020-09-15 LAB — RPR SER QL: NORMAL

## 2021-08-09 DIAGNOSIS — E03.9 HYPOTHYROIDISM, UNSPECIFIED TYPE: ICD-10-CM

## 2021-08-10 RX ORDER — LEVOTHYROXINE SODIUM 0.05 MG/1
TABLET ORAL
Qty: 90 TABLET | Refills: 3 | OUTPATIENT
Start: 2021-08-10

## (undated) DEVICE — CATH MPAC STP 6F: Brand: SUPER TORQUE

## (undated) DEVICE — RADIFOCUS OBTURATOR: Brand: RADIFOCUS

## (undated) DEVICE — PK TRY HEART CATH 50

## (undated) DEVICE — PINNACLE INTRODUCER SHEATH: Brand: PINNACLE